# Patient Record
Sex: MALE | Race: WHITE | NOT HISPANIC OR LATINO | Employment: OTHER | URBAN - METROPOLITAN AREA
[De-identification: names, ages, dates, MRNs, and addresses within clinical notes are randomized per-mention and may not be internally consistent; named-entity substitution may affect disease eponyms.]

---

## 2023-03-16 ENCOUNTER — HOSPITAL ENCOUNTER (OUTPATIENT)
Facility: HOSPITAL | Age: 63
Discharge: HOME | End: 2023-03-18
Attending: EMERGENCY MEDICINE | Admitting: INTERNAL MEDICINE
Payer: MEDICARE

## 2023-03-16 DIAGNOSIS — G40.919 BREAKTHROUGH SEIZURE: Primary | ICD-10-CM

## 2023-03-16 DIAGNOSIS — R78.89 SUBTHERAPEUTIC SERUM DILANTIN LEVEL: ICD-10-CM

## 2023-03-16 DIAGNOSIS — R07.9 CHEST PAIN: ICD-10-CM

## 2023-03-16 LAB
ALBUMIN SERPL-MCNC: 3.4 G/DL (ref 3.4–4.8)
ALBUMIN/GLOB SERPL: 0.9 RATIO (ref 1.1–2)
ALP SERPL-CCNC: 101 UNIT/L (ref 40–150)
ALT SERPL-CCNC: 51 UNIT/L (ref 0–55)
AST SERPL-CCNC: 25 UNIT/L (ref 5–34)
BASOPHILS # BLD AUTO: 0.01 X10(3)/MCL (ref 0–0.2)
BASOPHILS NFR BLD AUTO: 0.2 %
BILIRUBIN DIRECT+TOT PNL SERPL-MCNC: 0.1 MG/DL
BUN SERPL-MCNC: 10.6 MG/DL (ref 8.4–25.7)
CALCIUM SERPL-MCNC: 9.1 MG/DL (ref 8.8–10)
CHLORIDE SERPL-SCNC: 105 MMOL/L (ref 98–107)
CO2 SERPL-SCNC: 28 MMOL/L (ref 23–31)
CREAT SERPL-MCNC: 0.75 MG/DL (ref 0.73–1.18)
EOSINOPHIL # BLD AUTO: 0.08 X10(3)/MCL (ref 0–0.9)
EOSINOPHIL NFR BLD AUTO: 1.6 %
ERYTHROCYTE [DISTWIDTH] IN BLOOD BY AUTOMATED COUNT: 12.5 % (ref 11.5–17)
GFR SERPLBLD CREATININE-BSD FMLA CKD-EPI: >60 MLS/MIN/1.73/M2
GLOBULIN SER-MCNC: 3.6 GM/DL (ref 2.4–3.5)
GLUCOSE SERPL-MCNC: 125 MG/DL (ref 82–115)
HCT VFR BLD AUTO: 35.7 % (ref 42–52)
HGB BLD-MCNC: 11.8 G/DL (ref 14–18)
IMM GRANULOCYTES # BLD AUTO: 0.04 X10(3)/MCL (ref 0–0.04)
IMM GRANULOCYTES NFR BLD AUTO: 0.8 %
LYMPHOCYTES # BLD AUTO: 1.16 X10(3)/MCL (ref 0.6–4.6)
LYMPHOCYTES NFR BLD AUTO: 22.5 %
MCH RBC QN AUTO: 31.2 PG
MCHC RBC AUTO-ENTMCNC: 33.1 G/DL (ref 33–36)
MCV RBC AUTO: 94.4 FL (ref 80–94)
MONOCYTES # BLD AUTO: 0.37 X10(3)/MCL (ref 0.1–1.3)
MONOCYTES NFR BLD AUTO: 7.2 %
NEUTROPHILS # BLD AUTO: 3.5 X10(3)/MCL (ref 2.1–9.2)
NEUTROPHILS NFR BLD AUTO: 67.7 %
NRBC BLD AUTO-RTO: 0 %
PHENOBARB SERPL-MCNC: 20.6 UG/ML (ref 15–40)
PHENYTOIN SERPL-MCNC: 8.2 UG/ML (ref 10–20)
PLATELET # BLD AUTO: 248 X10(3)/MCL (ref 130–400)
PMV BLD AUTO: 9 FL (ref 7.4–10.4)
POTASSIUM SERPL-SCNC: 3.6 MMOL/L (ref 3.5–5.1)
PROT SERPL-MCNC: 7 GM/DL (ref 5.8–7.6)
RBC # BLD AUTO: 3.78 X10(6)/MCL (ref 4.7–6.1)
SODIUM SERPL-SCNC: 143 MMOL/L (ref 136–145)
WBC # SPEC AUTO: 5.2 X10(3)/MCL (ref 4.5–11.5)

## 2023-03-16 PROCEDURE — 85025 COMPLETE CBC W/AUTO DIFF WBC: CPT | Performed by: EMERGENCY MEDICINE

## 2023-03-16 PROCEDURE — 25000242 PHARM REV CODE 250 ALT 637 W/ HCPCS: Performed by: EMERGENCY MEDICINE

## 2023-03-16 PROCEDURE — 96374 THER/PROPH/DIAG INJ IV PUSH: CPT

## 2023-03-16 PROCEDURE — G0378 HOSPITAL OBSERVATION PER HR: HCPCS

## 2023-03-16 PROCEDURE — 99285 EMERGENCY DEPT VISIT HI MDM: CPT | Mod: 25

## 2023-03-16 PROCEDURE — 25000003 PHARM REV CODE 250: Performed by: EMERGENCY MEDICINE

## 2023-03-16 PROCEDURE — 80184 ASSAY OF PHENOBARBITAL: CPT | Performed by: EMERGENCY MEDICINE

## 2023-03-16 PROCEDURE — 63600175 PHARM REV CODE 636 W HCPCS: Performed by: EMERGENCY MEDICINE

## 2023-03-16 PROCEDURE — 80053 COMPREHEN METABOLIC PANEL: CPT | Performed by: EMERGENCY MEDICINE

## 2023-03-16 PROCEDURE — 80185 ASSAY OF PHENYTOIN TOTAL: CPT | Performed by: EMERGENCY MEDICINE

## 2023-03-16 RX ORDER — SIMETHICONE 80 MG
1 TABLET,CHEWABLE ORAL 4 TIMES DAILY PRN
Status: DISCONTINUED | OUTPATIENT
Start: 2023-03-16 | End: 2023-03-18 | Stop reason: HOSPADM

## 2023-03-16 RX ORDER — ASPIRIN 325 MG
50000 TABLET, DELAYED RELEASE (ENTERIC COATED) ORAL
COMMUNITY
Start: 2023-01-23

## 2023-03-16 RX ORDER — PHENYTOIN SODIUM 100 MG/1
100 CAPSULE, EXTENDED RELEASE ORAL DAILY
Status: ON HOLD | COMMUNITY
Start: 2023-02-23 | End: 2024-02-20

## 2023-03-16 RX ORDER — RISPERIDONE 0.5 MG/1
0.5 TABLET ORAL 2 TIMES DAILY
COMMUNITY
Start: 2023-01-23 | End: 2024-02-11 | Stop reason: CLARIF

## 2023-03-16 RX ORDER — PHENYTOIN SODIUM 100 MG/1
600 CAPSULE, EXTENDED RELEASE ORAL
Status: COMPLETED | OUTPATIENT
Start: 2023-03-16 | End: 2023-03-16

## 2023-03-16 RX ORDER — POLYETHYLENE GLYCOL 3350 17 G/17G
17 POWDER, FOR SOLUTION ORAL 2 TIMES DAILY PRN
Status: DISCONTINUED | OUTPATIENT
Start: 2023-03-16 | End: 2023-03-18 | Stop reason: HOSPADM

## 2023-03-16 RX ORDER — BENZONATATE 100 MG/1
100 CAPSULE ORAL
COMMUNITY
Start: 2023-03-08 | End: 2024-02-11 | Stop reason: CLARIF

## 2023-03-16 RX ORDER — PHENOBARBITAL 32.4 MG/1
129.6 TABLET ORAL NIGHTLY
Status: DISCONTINUED | OUTPATIENT
Start: 2023-03-16 | End: 2023-03-18 | Stop reason: HOSPADM

## 2023-03-16 RX ORDER — ONDANSETRON 2 MG/ML
4 INJECTION INTRAMUSCULAR; INTRAVENOUS EVERY 4 HOURS PRN
Status: DISCONTINUED | OUTPATIENT
Start: 2023-03-16 | End: 2023-03-18 | Stop reason: HOSPADM

## 2023-03-16 RX ORDER — LEVOTHYROXINE SODIUM 50 UG/1
50 TABLET ORAL EVERY MORNING
COMMUNITY
Start: 2022-12-21

## 2023-03-16 RX ORDER — NALOXONE HCL 0.4 MG/ML
0.02 VIAL (ML) INJECTION
Status: DISCONTINUED | OUTPATIENT
Start: 2023-03-16 | End: 2023-03-18 | Stop reason: HOSPADM

## 2023-03-16 RX ORDER — PRAVASTATIN SODIUM 40 MG/1
40 TABLET ORAL NIGHTLY
COMMUNITY
Start: 2023-01-23

## 2023-03-16 RX ORDER — PHENYTOIN SODIUM 100 MG/1
100 CAPSULE, EXTENDED RELEASE ORAL ONCE
Status: DISCONTINUED | OUTPATIENT
Start: 2023-03-17 | End: 2023-03-17

## 2023-03-16 RX ORDER — PHENOBARBITAL 32.4 MG/1
129.6 TABLET ORAL NIGHTLY
COMMUNITY
Start: 2023-02-22 | End: 2024-02-11 | Stop reason: CLARIF

## 2023-03-16 RX ORDER — MONTELUKAST SODIUM 10 MG/1
10 TABLET ORAL
COMMUNITY
Start: 2022-10-21 | End: 2024-02-11 | Stop reason: CLARIF

## 2023-03-16 RX ORDER — TALC
6 POWDER (GRAM) TOPICAL NIGHTLY PRN
Status: DISCONTINUED | OUTPATIENT
Start: 2023-03-16 | End: 2023-03-18 | Stop reason: HOSPADM

## 2023-03-16 RX ORDER — PROCHLORPERAZINE EDISYLATE 5 MG/ML
5 INJECTION INTRAMUSCULAR; INTRAVENOUS EVERY 6 HOURS PRN
Status: DISCONTINUED | OUTPATIENT
Start: 2023-03-16 | End: 2023-03-18 | Stop reason: HOSPADM

## 2023-03-16 RX ORDER — MAG HYDROX/ALUMINUM HYD/SIMETH 200-200-20
30 SUSPENSION, ORAL (FINAL DOSE FORM) ORAL 4 TIMES DAILY PRN
Status: DISCONTINUED | OUTPATIENT
Start: 2023-03-16 | End: 2023-03-18 | Stop reason: HOSPADM

## 2023-03-16 RX ORDER — LORAZEPAM 2 MG/ML
INJECTION INTRAMUSCULAR
Status: DISCONTINUED
Start: 2023-03-16 | End: 2023-03-16 | Stop reason: WASHOUT

## 2023-03-16 RX ORDER — ACETAMINOPHEN 325 MG/1
650 TABLET ORAL EVERY 6 HOURS PRN
Status: DISCONTINUED | OUTPATIENT
Start: 2023-03-16 | End: 2023-03-18 | Stop reason: HOSPADM

## 2023-03-16 RX ADMIN — PHENYTOIN SODIUM 600 MG: 100 CAPSULE ORAL at 06:03

## 2023-03-16 RX ADMIN — PHENOBARBITAL 129.6 MG: 32.4 TABLET ORAL at 09:03

## 2023-03-16 RX ADMIN — DEXTROSE MONOHYDRATE 600 MG PE: 50 INJECTION, SOLUTION INTRAVENOUS at 06:03

## 2023-03-16 NOTE — ED PROVIDER NOTES
Encounter Date: 3/16/2023    SCRIBE #1 NOTE: I, Alaina Collier, am scribing for, and in the presence of,  Sheila Craig DO. I have scribed the following portions of the note - Other sections scribed: HPI, ROS, PE.     History     Chief Complaint   Patient presents with    Seizures     Pt reports per aasi with reports of witnessed seizure. Did not fall. -hit head. On dilantin and phenobarbital. GCS 11, which is baseline. Nonverbal.      62 year old male with a history of seizures and a baseline GCS of 11 presents to ED, via EMS, after having a seizure.  His family member, at bedside, says that he was sitting in his wheel chair and went rigid and unresponsive for 7-10 minutes.  He takes phenobarbital and dilantin for his seizures.  He finished a Z pack for bronchitis on Sunday.  Family member says that the last time he had to take antibiotics, his dilantin levels dropped and he had a seizure.  Pt is back to his baseline.      Review of patient's allergies indicates:  No Known Allergies  Past Medical History:   Diagnosis Date    Mental disability     Seizures      History reviewed. No pertinent surgical history.  History reviewed. No pertinent family history.  Social History     Tobacco Use    Smoking status: Never    Smokeless tobacco: Never   Substance Use Topics    Alcohol use: Never     Review of Systems   Neurological:  Positive for seizures.     Physical Exam     Initial Vitals [03/16/23 1536]   BP Pulse Resp Temp SpO2   (!) 143/81 98 18 97.2 °F (36.2 °C) 99 %      MAP       --         Physical Exam    Nursing note and vitals reviewed.  Constitutional: He appears well-developed and well-nourished. He is not diaphoretic. He does not appear ill. No distress.   HENT:   Head: Normocephalic and atraumatic.   Right Ear: External ear normal.   Left Ear: External ear normal.   Nose: Nose normal.   Mouth/Throat: Oropharynx is clear and moist.   Eyes: Conjunctivae are normal.   Neck: Neck supple. No tracheal  deviation present.   Cardiovascular:  Normal rate, regular rhythm and normal heart sounds.           Pulmonary/Chest: Breath sounds normal. No respiratory distress. He has no wheezes. He has no rhonchi. He has no rales.   Abdominal: Abdomen is soft. He exhibits no distension. There is no abdominal tenderness.   No right CVA tenderness.  No left CVA tenderness.   Musculoskeletal:         General: Normal range of motion.      Cervical back: Neck supple.      Comments: Upper extremities are contracted     Neurological: He is alert and oriented to person, place, and time. He has normal strength. No cranial nerve deficit.   Back to his neurologic baseline, answers yes or no questions   Skin: Skin is warm and dry. Capillary refill takes less than 2 seconds. No pallor.   Psychiatric: He has a normal mood and affect. His mood appears not anxious.       ED Course   Procedures  Labs Reviewed   COMPREHENSIVE METABOLIC PANEL - Abnormal; Notable for the following components:       Result Value    Glucose Level 125 (*)     Globulin 3.6 (*)     Albumin/Globulin Ratio 0.9 (*)     All other components within normal limits   PHENYTOIN LEVEL, TOTAL - Abnormal; Notable for the following components:    Phenytoin Level Total 8.2 (*)     All other components within normal limits   CBC WITH DIFFERENTIAL - Abnormal; Notable for the following components:    RBC 3.78 (*)     Hgb 11.8 (*)     Hct 35.7 (*)     MCV 94.4 (*)     All other components within normal limits   PHENOBARBITAL LEVEL - Normal   CBC W/ AUTO DIFFERENTIAL    Narrative:     The following orders were created for panel order CBC auto differential.  Procedure                               Abnormality         Status                     ---------                               -----------         ------                     CBC with Differential[[id number]]        Abnormal            Final result                 Please view results for these tests on the individual orders.           Imaging Results    None          Medications   PHENobarbitaL tablet 129.6 mg (has no administration in time range)   phenytoin (DILANTIN) ER capsule 100 mg (has no administration in time range)   phenytoin (DILANTIN) ER capsule 600 mg (600 mg Oral Given 3/16/23 1810)   FOSphenytoin (CEREBYX) 600 mg PE in dextrose 5 % (D5W) 100 mL IVPB (0 mg PE Intravenous Stopped 3/16/23 1905)              Scribe Attestation:   Scribe #1: I performed the above scribed service and the documentation accurately describes the services I performed. I attest to the accuracy of the note.    Attending Attestation:           Physician Attestation for Scribe:  Physician Attestation Statement for Scribe #1: I, Sheila Craig, DO, reviewed documentation, as scribed by Alaina Collier in my presence, and it is both accurate and complete.           ED Course as of 03/16/23 2025   Thu Mar 16, 2023   1717 Phenobarbital: 20.6  Therapeutic  [KM]   1717 Phenytoin Lvl(!): 8.2  Not therapeutic, will replace  [KM]   1822 At time of discharge, patient had another brief seizure which stopped on its own. Will give Fosphenytoin IV and observe  [KM]   2008 Per family, patient is still not fully back to baseline. Will observe overnight  [KM]      ED Course User Index  [KM] Sheila Craig MD               Medical Decision Making  61 yo male presenting after seizure, compliant with medications but just finished abx course. Back to baseline. Did not hit head.     Dilantin level is low, labs otherwise normal  Loaded with Dilantin 600 mg PO   Patient had another seizure at time of discharge, loaded with Fosphenytoin IV   Per family, patient did not appear to return to baseline   Consult: hospitalist, will observe overnight     Problems Addressed:  Breakthrough seizure: acute illness or injury that poses a threat to life or bodily functions  Subtherapeutic serum dilantin level: acute illness or injury that poses a threat to life or bodily functions    Amount  and/or Complexity of Data Reviewed  Independent Historian: caregiver  External Data Reviewed: notes.     Details: reviewed last neurology clinic visit in 11/2021- continue Dilantin and Phenobarbital at same doses  Labs: ordered. Decision-making details documented in ED Course.    Risk  Prescription drug management.       Clinical Impression:   Final diagnoses:  [G40.919] Breakthrough seizure (Primary)  [R78.89] Subtherapeutic serum dilantin level        ED Disposition Condition    Observation Stable                  Sheila Craig MD  03/16/23 1804       Sheila Craig MD  03/16/23 1831       Sheila Craig MD  03/16/23 2026

## 2023-03-17 PROBLEM — G40.919 BREAKTHROUGH SEIZURE: Status: ACTIVE | Noted: 2023-03-17

## 2023-03-17 LAB
ALBUMIN SERPL-MCNC: 3.1 G/DL (ref 3.4–4.8)
ALBUMIN/GLOB SERPL: 1.1 RATIO (ref 1.1–2)
ALP SERPL-CCNC: 91 UNIT/L (ref 40–150)
ALT SERPL-CCNC: 44 UNIT/L (ref 0–55)
AST SERPL-CCNC: 23 UNIT/L (ref 5–34)
BASOPHILS # BLD AUTO: 0.03 X10(3)/MCL (ref 0–0.2)
BASOPHILS NFR BLD AUTO: 0.5 %
BILIRUBIN DIRECT+TOT PNL SERPL-MCNC: 0.2 MG/DL
BUN SERPL-MCNC: 7.9 MG/DL (ref 8.4–25.7)
CALCIUM SERPL-MCNC: 9 MG/DL (ref 8.8–10)
CHLORIDE SERPL-SCNC: 106 MMOL/L (ref 98–107)
CO2 SERPL-SCNC: 29 MMOL/L (ref 23–31)
CREAT SERPL-MCNC: 0.69 MG/DL (ref 0.73–1.18)
EOSINOPHIL # BLD AUTO: 0.15 X10(3)/MCL (ref 0–0.9)
EOSINOPHIL NFR BLD AUTO: 2.3 %
ERYTHROCYTE [DISTWIDTH] IN BLOOD BY AUTOMATED COUNT: 12.7 % (ref 11.5–17)
GFR SERPLBLD CREATININE-BSD FMLA CKD-EPI: >60 MLS/MIN/1.73/M2
GLOBULIN SER-MCNC: 2.7 GM/DL (ref 2.4–3.5)
GLUCOSE SERPL-MCNC: 93 MG/DL (ref 82–115)
HCT VFR BLD AUTO: 31.6 % (ref 42–52)
HGB BLD-MCNC: 10.4 G/DL (ref 14–18)
IMM GRANULOCYTES # BLD AUTO: 0.04 X10(3)/MCL (ref 0–0.04)
IMM GRANULOCYTES NFR BLD AUTO: 0.6 %
LYMPHOCYTES # BLD AUTO: 2.16 X10(3)/MCL (ref 0.6–4.6)
LYMPHOCYTES NFR BLD AUTO: 32.6 %
MAGNESIUM SERPL-MCNC: 2 MG/DL (ref 1.6–2.6)
MCH RBC QN AUTO: 31.2 PG
MCHC RBC AUTO-ENTMCNC: 32.9 G/DL (ref 33–36)
MCV RBC AUTO: 94.9 FL (ref 80–94)
MONOCYTES # BLD AUTO: 0.71 X10(3)/MCL (ref 0.1–1.3)
MONOCYTES NFR BLD AUTO: 10.7 %
NEUTROPHILS # BLD AUTO: 3.54 X10(3)/MCL (ref 2.1–9.2)
NEUTROPHILS NFR BLD AUTO: 53.3 %
NRBC BLD AUTO-RTO: 0 %
PHENYTOIN SERPL-MCNC: 15.4 UG/ML (ref 10–20)
PHOSPHATE SERPL-MCNC: 3.4 MG/DL (ref 2.3–4.7)
PLATELET # BLD AUTO: 250 X10(3)/MCL (ref 130–400)
PMV BLD AUTO: 8.9 FL (ref 7.4–10.4)
POTASSIUM SERPL-SCNC: 3.9 MMOL/L (ref 3.5–5.1)
PROT SERPL-MCNC: 5.8 GM/DL (ref 5.8–7.6)
RBC # BLD AUTO: 3.33 X10(6)/MCL (ref 4.7–6.1)
SODIUM SERPL-SCNC: 143 MMOL/L (ref 136–145)
WBC # SPEC AUTO: 6.6 X10(3)/MCL (ref 4.5–11.5)

## 2023-03-17 PROCEDURE — 83735 ASSAY OF MAGNESIUM: CPT | Performed by: NURSE PRACTITIONER

## 2023-03-17 PROCEDURE — 84100 ASSAY OF PHOSPHORUS: CPT | Performed by: NURSE PRACTITIONER

## 2023-03-17 PROCEDURE — 25000003 PHARM REV CODE 250: Performed by: NURSE PRACTITIONER

## 2023-03-17 PROCEDURE — 25000003 PHARM REV CODE 250: Performed by: INTERNAL MEDICINE

## 2023-03-17 PROCEDURE — 25000003 PHARM REV CODE 250: Performed by: EMERGENCY MEDICINE

## 2023-03-17 PROCEDURE — 99222 PR INITIAL HOSPITAL CARE,LEVL II: ICD-10-PCS | Mod: ,,, | Performed by: SPECIALIST

## 2023-03-17 PROCEDURE — G0378 HOSPITAL OBSERVATION PER HR: HCPCS

## 2023-03-17 PROCEDURE — 80185 ASSAY OF PHENYTOIN TOTAL: CPT | Performed by: EMERGENCY MEDICINE

## 2023-03-17 PROCEDURE — 85025 COMPLETE CBC W/AUTO DIFF WBC: CPT | Performed by: NURSE PRACTITIONER

## 2023-03-17 PROCEDURE — 80053 COMPREHEN METABOLIC PANEL: CPT | Performed by: NURSE PRACTITIONER

## 2023-03-17 PROCEDURE — 99222 1ST HOSP IP/OBS MODERATE 55: CPT | Mod: ,,, | Performed by: SPECIALIST

## 2023-03-17 RX ORDER — RISPERIDONE 0.25 MG/1
0.5 TABLET ORAL 2 TIMES DAILY
Status: DISCONTINUED | OUTPATIENT
Start: 2023-03-17 | End: 2023-03-18 | Stop reason: HOSPADM

## 2023-03-17 RX ORDER — LEVOTHYROXINE SODIUM 50 UG/1
50 TABLET ORAL EVERY MORNING
Status: DISCONTINUED | OUTPATIENT
Start: 2023-03-17 | End: 2023-03-18 | Stop reason: HOSPADM

## 2023-03-17 RX ORDER — PHENYTOIN SODIUM 100 MG/1
200 CAPSULE, EXTENDED RELEASE ORAL NIGHTLY
Status: ON HOLD | COMMUNITY
End: 2024-02-20

## 2023-03-17 RX ORDER — PHENYTOIN SODIUM 100 MG/1
100 CAPSULE, EXTENDED RELEASE ORAL DAILY
Status: DISCONTINUED | OUTPATIENT
Start: 2023-03-17 | End: 2023-03-18 | Stop reason: HOSPADM

## 2023-03-17 RX ORDER — PRAVASTATIN SODIUM 40 MG/1
40 TABLET ORAL NIGHTLY
Status: DISCONTINUED | OUTPATIENT
Start: 2023-03-17 | End: 2023-03-18 | Stop reason: HOSPADM

## 2023-03-17 RX ORDER — PHENYTOIN SODIUM 100 MG/1
200 CAPSULE, EXTENDED RELEASE ORAL NIGHTLY
Status: DISCONTINUED | OUTPATIENT
Start: 2023-03-17 | End: 2023-03-18 | Stop reason: HOSPADM

## 2023-03-17 RX ADMIN — PHENOBARBITAL 129.6 MG: 32.4 TABLET ORAL at 08:03

## 2023-03-17 RX ADMIN — PHENYTOIN SODIUM 200 MG: 100 CAPSULE ORAL at 08:03

## 2023-03-17 RX ADMIN — RISPERIDONE 0.5 MG: 0.25 TABLET, FILM COATED ORAL at 08:03

## 2023-03-17 RX ADMIN — PHENYTOIN SODIUM 100 MG: 100 CAPSULE ORAL at 08:03

## 2023-03-17 RX ADMIN — PRAVASTATIN SODIUM 40 MG: 40 TABLET ORAL at 08:03

## 2023-03-17 RX ADMIN — LEVOTHYROXINE SODIUM 50 MCG: 50 TABLET ORAL at 06:03

## 2023-03-17 NOTE — PROGRESS NOTES
[facility]  Hospital Medicine Progress Note        CHIEF COMPLAINT   Seizures (Pt reports per aasi with reports of witnessed seizure. Did not fall. -hit head. On dilantin and phenobarbital. GCS 11, which is baseline. Nonverbal. )        HISTORY OF PRESENT ILLNESS:   Much of the information for this HPI was gathered from the medical record and his sister at the bedside, as he is not able to help much due to his mental disability.  Mr. Anderson is a 62 year old male with a pmh of mental disability and seizures on dilantin and phenobarbital who presented to the ED after having a seizure earlier today.  Family states that the seizure lasted approximately 7-10 minutes.  Family states that he just finished a Z-diamond 3 days ago, and the last time he was on antibiotics, it dropped his dilantin level and he had a seizure.  His dilantin level today was 8.2, phenobarbital level was 20.6, normal. All other indices unremarkable.  He was given a 600 mg oral dose of Dilantin in the ED in preparation of dc home, but then he had another seizure.  He was then given Cerebyx 600 mg IV in the ED and admitted to hospital medicine for management.    62-year-old male with epilepsy who presented with breakthrough seizure prior to arrival and had another witnessed seizure in the ER. Patient is on Dilantin and phenobarbital.  Dilantin level subtherapeutic on arrival.      Todays information  - vitals reviewed and stable   Labs reviewed and stable   Get a CT of the head without contrast stat  Consult Neuro to adjust antiepileptic medications  Family members report abdominal distension   CT abdomen was ordered in the ED, follow-up with results       Exam  GENERAL: awake, alert, oriented and in no acute distress, non-toxic appearing   HEENT: normocephalic atraumatic   NECK: supple   LUNGS: Clear bilaterally, no wheezing or rales, no accessory muscle use   CVS: Regular rate and rhythm, normal peripheral perfusion  ABD:  Soft, non-tender, non-distended, bowel sounds present  EXTREMITIES: no clubbing or cyanosis  SKIN: Warm, dry.   NEURO: alert and oriented, grossly without focal deficits   PSYCHIATRIC: Cooperative    ASSESSMENT & PLAN:     1.  Breakthrough seizure  2.  Subtherapeutic dilantin level contributing to above  3. Abdominal distension    PLAN:  - vitals reviewed and stable   Labs reviewed and stable   Get a CT of the head without contrast stat  Consult Neuro to adjust antiepileptic medications  Continue current AEDS    Family members report abdominal distension   CT abdomen was ordered in the ED, follow-up with results   Seizure precautions  Resume home meds      DVT prophylaxis: SCDs  Code status: Full     Dispo pending neuro eval      VITAL SIGNS: 24 HRS MIN & MAX LAST   Temp  Min: 97.2 °F (36.2 °C)  Max: 98.2 °F (36.8 °C) 98.2 °F (36.8 °C)   BP  Min: 105/58  Max: 174/83 136/79   Pulse  Min: 74  Max: 105  93   Resp  Min: 12  Max: 21 13   SpO2  Min: 93 %  Max: 100 % 100 %       Recent Labs   Lab 03/16/23  1609 03/17/23  0418   WBC 5.2 6.6   RBC 3.78* 3.33*   HGB 11.8* 10.4*   HCT 35.7* 31.6*   MCV 94.4* 94.9*   MCH 31.2 31.2   MCHC 33.1 32.9*   RDW 12.5 12.7    250   MPV 9.0 8.9       Recent Labs   Lab 03/16/23  1609 03/17/23  0418    143   K 3.6 3.9   CO2 28 29   BUN 10.6 7.9*   CREATININE 0.75 0.69*   CALCIUM 9.1 9.0   MG  --  2.00   ALBUMIN 3.4 3.1*   ALKPHOS 101 91   ALT 51 44   AST 25 23   BILITOT 0.1 0.2          Microbiology Results (last 7 days)       ** No results found for the last 168 hours. **             See below for Radiology    Scheduled Med:   levothyroxine  50 mcg Oral QAM    PHENobarbitaL  129.6 mg Oral Nightly    phenytoin  100 mg Oral Daily    phenytoin  200 mg Oral QHS    pravastatin  40 mg Oral QHS    risperiDONE  0.5 mg Oral BID        Continuous Infusions:       PRN Meds:  acetaminophen, aluminum-magnesium hydroxide-simethicone, melatonin, naloxone, ondansetron, polyethylene glycol,  prochlorperazine, simethicone         VTE prophylaxis:     Patient condition:  Stable/Fair/Guarded/ Serious/ Critical    Anticipated discharge and Disposition:         All diagnosis and differential diagnosis have been reviewed; assessment and plan has been documented; I have personally reviewed the labs and test results that are presently available; I have reviewed the patients medication list; I have reviewed the consulting providers response and recommendations. I have reviewed or attempted to review medical records based upon their availability    All of the patient's questions have been  addressed and answered. Patient's is agreeable to the above stated plan. I will continue to monitor closely and make adjustments to medical management as needed.  _____________________________________________________________________    Nutrition Status:    Radiology:  X-Ray Abdomen Flat And Erect  Narrative: EXAMINATION:  XR ABDOMEN FLAT AND ERECT    CLINICAL HISTORY:  abd distension;    TECHNIQUE:  Flat and erect AP views of the abdomen.    COMPARISON:  CT 03/17/2023    FINDINGS:  Multiple gas-filled segments of small bowel and colon.  No dilated bowel, significant air-fluid levels or definitive findings for pneumoperitoneum.  Small volume stool.  Impression: Nonobstructive bowel gas pattern.    Electronically signed by: Saeed Hernandez  Date:    03/17/2023  Time:    10:56  CT Head Without Contrast  EXAMINATION  CT HEAD WITHOUT CONTRAST    CLINICAL HISTORY  breakthrough seizures with fall;    TECHNIQUE  Axial non-contrast CT images of the head were acquired and multiplanar reconstructions accomplished by a CT technologist at a separate workstation, pushed to PACS for physician review.    COMPARISON  None available at the time of the initial interpretation.    FINDINGS  Images were reviewed in subdural, brain, soft tissue, and bone windows.    Exam quality: Motion/streak artifact limits assessment of the posterior  fossa.    Hemorrhage: No evidence of acute hyperattenuating blood products.    Parenchyma: No discrete mass, localized mass-effect, or CT evidence of an acute territorial cortical-based ischemic insult. Gray-white differentiation is preserved.    Midline shift: None.    CSF spaces: Normal ventricle size and configuration. No masses or expansile fluid collections.    Vasculature: No hyperdense artery identified. No abnormal densities within the dural sinuses.    Other findings: No abnormalities of the scalp or subjacent osseous structures. Mastoids are well aerated. No focal abnormality of the sella. The included facial structures are unremarkable.    IMPRESSION  No CT-evident acute intracranial abnormality.    RADIATION DOSE  Automated tube current modulation, weight-based exposure dosing, and/or iterative reconstruction technique utilized to reach lowest reasonably achievable exposure rate.    DLP: 981 mGy*cm    Electronically signed by: Sherman Carnes  Date:    03/17/2023  Time:    09:33  CT Abdomen Pelvis  Without Contrast  Narrative: EXAMINATION:  CT ABDOMEN PELVIS WITHOUT CONTRAST    CLINICAL HISTORY:  Abdominal pain, acute, nonlocalized;Abdominal distention, family request;    TECHNIQUE:  Helical acquisition through the abdomen and pelvis without IV contrast.  Lack of contrast limits evaluation of solid organs and vascular structures .  Three plane reconstructions were provided for review.  mGycm. Automatic exposure control, adjustment of mA/kV or iterative reconstruction technique was used to reduce radiation.    COMPARISON:  No prior CT    FINDINGS:  Lung bases are grossly clear.  There is gynecomastia.    Scattered calcifications are seen along the liver capsule likely the sequela of remote insult.  The gallbladder is surgically absent.  There is no significant abnormality of the spleen.  There is some fatty atrophy of the pancreas.  Normal adrenals.  No hydronephrosis.    There is no bowel  obstruction.  Normal appendix.  Moderate stool in the colon.    Mild bladder wall thickening may relate to chronic outlet obstruction.  Prostate not significantly enlarged.  No pelvic free fluid.  The abdominal aorta is normal in caliber.  Minimal atherosclerotic disease.  No enlarged retroperitoneal lymph nodes.    Degenerative changes of the spine with some focal kyphosis thoracolumbar junction.  No acute osseous findings.  Impression: No acute abdominopelvic findings on this noncontrast scan.  Chronic findings above.    Electronically signed by: Alec Mckeon  Date:    03/17/2023  Time:    09:28      Jemal Montague MD   03/17/2023

## 2023-03-17 NOTE — PLAN OF CARE
Referral sent to [facility] via [employer].     03/17/23 1528   Discharge Assessment   Assessment Type Discharge Planning Assessment   Confirmed/corrected address, phone number and insurance Yes   Confirmed Demographics Correct on Facesheet   Source of Information family   When was your last doctors appointment? 03/01/23  (PCP: Dr. Claude Meeks)   Communicated IRAM with patient/caregiver Date not available/Unable to determine   Reason For Admission seizure   People in Home parent(s);sibling(s)   Do you expect to return to your current living situation? Yes   Do you have help at home or someone to help you manage your care at home? Yes   Who are your caregiver(s) and their phone number(s)? sister is primary caregiver Cadence Riley (cell [phone], house [phone])   Prior to hospitilization cognitive status: Unable to Assess   Current cognitive status: Unable to Assess   Walking or Climbing Stairs ambulation difficulty, dependent   Mobility Management wheelchair   Dressing/Bathing bathing difficulty, requires equipment   Home Accessibility wheelchair accessible   Home Layout Able to live on 1st floor   Equipment Currently Used at Home bath bench;grab bar;wheelchair   Readmission within 30 days? No   Patient currently being followed by outpatient case management? No   Do you currently have service(s) that help you manage your care at home? No  (had [facility] recently dc'd, would like to be re-established)   Do you take prescription medications? Yes  (fills rx at [facility] in [place])   Do you have prescription coverage? Yes   Coverage Medicare/Medicaid   Do you have any problems affording any of your prescribed medications? No   Is the patient taking medications as prescribed? yes   Who is going to help you get home at discharge? sister or niece will bring home   How do you get to doctors appointments? family or friend will provide   Are you on dialysis? No   Do you take coumadin? No   Discharge  Plan A Home Health   Discharge Plan B Home Health   DME Needed Upon Discharge  none   Discharge Plan discussed with: Sibling   Discharge Barriers Identified None     Initial dc planning assessment was performed with patients sister/primary caregiver, Melinda.  He was recently dc'd from  services with [facility], would like to be re-established. A referral was sent.

## 2023-03-17 NOTE — H&P
[facility] Hospital Medicine History & Physical Examination       Patient Name: Sincere Anderson  MRN: [id number]  Patient Class: OP- Observation   Admission Date: 3/16/2023  3:40 PM  Length of Stay: 0  Admitting Service: Hospital Medicine   Attending Physician: Milton Patel MD   Primary Care Provider: Claude H Meeks, MD  History source: EMR, patient and/or patient's family    CHIEF COMPLAINT   Seizures (Pt reports per aasi with reports of witnessed seizure. Did not fall. -hit head. On dilantin and phenobarbital. GCS 11, which is baseline. Nonverbal. )      HISTORY OF PRESENT ILLNESS:   Much of the information for this HPI was gathered from the medical record and his sister at the bedside, as he is not able to help much due to his mental disability.  Mr. Anderson is a 62 year old male with a pmh of mental disability and seizures on dilantin and phenobarbital who presented to the ED after having a seizure earlier today.  Family states that the seizure lasted approximately 7-10 minutes.  Family states that he just finished a Z-diamond 3 days ago, and the last time he was on antibiotics, it dropped his dilantin level and he had a seizure.  His dilantin level today was 8.2, phenobarbital level was 20.6, normal. All other indices unremarkable.  He was given a 600 mg oral dose of Dilantin in the ED in preparation of dc home, but then he had another seizure.  He was then given Cerebyx 600 mg IV in the ED and admitted to hospital medicine for management.    PAST MEDICAL HISTORY:     Past Medical History:   Diagnosis Date    Mental disability     Seizures        PAST SURGICAL HISTORY:   Cholecystectomy    ALLERGIES:   Patient has no known allergies.    FAMILY HISTORY:   Reviewed and non-contributory     SOCIAL HISTORY:     Social History     Tobacco Use    Smoking status: Never    Smokeless tobacco: Never   Substance Use Topics    Alcohol use: Never        HOME MEDICATIONS:     Prior to  Admission medications    Medication Sig Start Date End Date Taking? Authorizing Provider   benzonatate (TESSALON) 100 MG capsule Take 100 mg by mouth. 3/8/23   Historical Provider   cholecalciferol, vitamin D3, 1,250 mcg (50,000 unit) capsule Take 50,000 Units by mouth every 7 days. 1/23/23   Historical Provider   DILANTIN EXTENDED 100 mg ER capsule Take by mouth. 2/23/23   Historical Provider   levothyroxine (SYNTHROID) 50 MCG tablet Take 50 mcg by mouth every morning. 12/21/22   Historical Provider   montelukast (SINGULAIR) 10 mg tablet Take 10 mg by mouth. 10/21/22   Historical Provider   PHENobarbitaL 32.4 MG tablet Take 129.6 mg by mouth every evening. 2/22/23   Historical Provider   pravastatin (PRAVACHOL) 40 MG tablet Take 40 mg by mouth every evening. 1/23/23   Historical Provider   risperiDONE (RISPERDAL) 0.5 MG Tab Take 0.5 mg by mouth 2 (two) times daily. 1/23/23   Historical Provider       REVIEW OF SYSTEMS:   Except as documented, all other systems reviewed and negative     PHYSICAL EXAM:   T 97.2 °F (36.2 °C)   BP (!) 158/86   P 98   RR 18   O2 97 %  GENERAL: awake, alert, oriented and in no acute distress, non-toxic appearing   HEENT: normocephalic atraumatic   NECK: supple   LUNGS: Clear bilaterally, no wheezing or rales, no accessory muscle use   CVS: Regular rate and rhythm, normal peripheral perfusion  ABD: Soft, non-tender, non-distended, bowel sounds present  EXTREMITIES: no clubbing or cyanosis  SKIN: Warm, dry.   NEURO: alert and oriented, grossly without focal deficits   PSYCHIATRIC: Cooperative    LABS AND IMAGING:     Recent Labs     03/16/23  1609   WBC 5.2   RBC 3.78*   HGB 11.8*   HCT 35.7*   MCV 94.4*   MCH 31.2   MCHC 33.1   RDW 12.5        No results for input(s): LACTIC in the last 72 hours.  No results for input(s): INR, APTT, D-DIMER in the last 72 hours.  No results for input(s): HGBA1C, CHOL, TRIG, LDL, VLDL, HDL in the last 72 hours.   Recent Labs     03/16/23  1609   NA  143   K 3.6   CHLORIDE 105   CO2 28   BUN 10.6   CREATININE 0.75   GLUCOSE 125*   CALCIUM 9.1   ALBUMIN 3.4   GLOBULIN 3.6*   ALKPHOS 101   ALT 51   AST 25   BILITOT 0.1     No results for input(s): BNP, CPK, TROPONINI in the last 72 hours.       No image results found.      ASSESSMENT & PLAN:     1.  Breakthrough seizure  2.  Subtherapeutic dilantin level contributing to above      PLAN:  -Dilantin loading dose given in ED  -Seizure precautions  -Resume home meds  -Labs in AM    I, Adrianna Orlando NP have reviewed and discussed this case with Dr. Patel.  Please see addendum for further assessment and plan from attending MD.    DVT prophylaxis: SCDs  Code status: Full      ___________________________________________________________________  I, Dr. Milton Paetl assumed care of this patient.  For the patient encounter, I performed the substantive portion of the visit, I reviewed the NPPA documentation, treatment plan, and medical decision making.  I had face to face time with this patient.  I have personally reviewed the labs and test results that are presently available. I have reviewed or attempted to review medical records based upon their availability. If patient was admitted under observational status it is with my approval.      62-year-old male with epilepsy who presented with breakthrough seizure prior to arrival and had another witnessed seizure in the ER.  Recent new medications including steroids which were stopped yesterday and azithromycin completed recently as well.  Patient is on Dilantin and phenobarbital.  Dilantin level subtherapeutic on arrival.  Patient given Dilantin IV in the ER and is being admitted for monitoring overnight.     Time seen: 11PM 3/16/23  Milton Patel MD

## 2023-03-17 NOTE — PLAN OF CARE
BRAN Letter explained to patient's sister via phone, questions answered. Letter sent to DANNIE Ambriz for signature/delivery.

## 2023-03-17 NOTE — CONSULTS
Chr epilepsy patient w special needs    Breakthr seizure typical when he's sick or gets antibx per sister caregiver     Baseline nonverbal nonambulatory     Exam now:  Friendly   Reached his hand out to shake mine   Smiling   [employer]'s     CT head cerebellar atrophy     Pht 100/200  Phb 34.6 (4) nightly among others     Levels pht 8 then 15; phb 21    Dx:   Chr epil with breakthr seiz    Rec: resume anticonvulsants at home dose     Signing off   Hopefully home tomorrow   Fu primary MD or previous neurologist

## 2023-03-18 VITALS
HEIGHT: 72 IN | SYSTOLIC BLOOD PRESSURE: 114 MMHG | WEIGHT: 186.94 LBS | DIASTOLIC BLOOD PRESSURE: 70 MMHG | TEMPERATURE: 98 F | HEART RATE: 75 BPM | BODY MASS INDEX: 25.32 KG/M2 | OXYGEN SATURATION: 99 % | RESPIRATION RATE: 20 BRPM

## 2023-03-18 PROBLEM — G40.919 BREAKTHROUGH SEIZURE: Status: RESOLVED | Noted: 2023-03-17 | Resolved: 2023-03-18

## 2023-03-18 PROCEDURE — G0378 HOSPITAL OBSERVATION PER HR: HCPCS

## 2023-03-18 PROCEDURE — 25000003 PHARM REV CODE 250: Performed by: NURSE PRACTITIONER

## 2023-03-18 PROCEDURE — 25000003 PHARM REV CODE 250: Performed by: INTERNAL MEDICINE

## 2023-03-18 RX ADMIN — LEVOTHYROXINE SODIUM 50 MCG: 50 TABLET ORAL at 06:03

## 2023-03-18 RX ADMIN — PHENYTOIN SODIUM 100 MG: 100 CAPSULE ORAL at 09:03

## 2023-03-18 RX ADMIN — RISPERIDONE 0.5 MG: 0.25 TABLET, FILM COATED ORAL at 09:03

## 2023-03-18 NOTE — PLAN OF CARE
Problem: Adult Inpatient Plan of Care  Goal: Plan of Care Review  Outcome: Ongoing, Progressing  Flowsheets (Taken 3/18/2023 0236)  Plan of Care Reviewed With: patient  Goal: Patient-Specific Goal (Individualized)  Outcome: Ongoing, Progressing  Goal: Absence of Hospital-Acquired Illness or Injury  Outcome: Ongoing, Progressing  Goal: Optimal Comfort and Wellbeing  Outcome: Ongoing, Progressing     Problem: Skin Injury Risk Increased  Goal: Skin Health and Integrity  Outcome: Ongoing, Progressing

## 2023-03-18 NOTE — PROGRESS NOTES
[facility] Hospital Medicine Progress Note        Chief Complaint: Inpatient Follow-up for seizure    HPI:   Much of the information for this HPI was gathered from the medical record and his sister at the bedside, as he is not able to help much due to his mental disability.  Mr. Anderson is a 62 year old male with a pmh of mental disability and seizures on dilantin and phenobarbital who presented to the ED after having a seizure earlier today.  Family states that the seizure lasted approximately 7-10 minutes.  Family states that he just finished a Z-diamond 3 days ago, and the last time he was on antibiotics, it dropped his dilantin level and he had a seizure.  His dilantin level today was 8.2, phenobarbital level was 20.6, normal. All other indices unremarkable.  He was given a 600 mg oral dose of Dilantin in the ED in preparation of dc home, but then he had another seizure.  He was then given Cerebyx 600 mg IV in the ED and admitted to hospital medicine for management.    Interval Hx:   Afebrile.  Hemodynamically stable.  Doing well on room air.  No labs were available for this morning.  CT abdomen was benign    Objective/physical exam:  Vitals:    03/18/23 0012 03/18/23 0442 03/18/23 0444 03/18/23 0721   BP:  125/70  131/79   Pulse:  75  71   Resp:    20   Temp: 97.5 °F (36.4 °C)  97.5 °F (36.4 °C) 98.2 °F (36.8 °C)   TempSrc: Axillary  Axillary Oral   SpO2:  (!) 94%  96%   Weight:       Height:         General: In no acute distress, afebrile  Respiratory: Clear to auscultation bilaterally  Cardiovascular: S1, S2, no appreciable murmur  Abdomen: Soft, nontender, BS +  MSK: Warm, no lower extremity edema, no clubbing or cyanosis  Neurologic: Alert and oriented x4, moving all extremities with good strength     Lab Results   Component Value Date     03/17/2023    K 3.9 03/17/2023    CO2 29 03/17/2023    BUN 7.9 (L) 03/17/2023    CREATININE 0.69 (L) 03/17/2023    CALCIUM 9.0 03/17/2023       Lab Results   Component Value Date    ALT 44 03/17/2023    AST 23 03/17/2023    ALKPHOS 91 03/17/2023    BILITOT 0.2 03/17/2023      Lab Results   Component Value Date    WBC 6.6 03/17/2023    HGB 10.4 (L) 03/17/2023    HCT 31.6 (L) 03/17/2023    MCV 94.9 (H) 03/17/2023     03/17/2023           Medications:   levothyroxine  50 mcg Oral QAM    PHENobarbitaL  129.6 mg Oral Nightly    phenytoin  100 mg Oral Daily    phenytoin  200 mg Oral QHS    pravastatin  40 mg Oral QHS    risperiDONE  0.5 mg Oral BID      acetaminophen, aluminum-magnesium hydroxide-simethicone, melatonin, naloxone, ondansetron, polyethylene glycol, prochlorperazine, simethicone     Assessment/Plan:    Breakthrough seizure   Subtherapeutic Dilantin   Abdominal distension -improving  Chronic hypothyroidism  Hyperlipidemia    Plan:   -neurology following.  Appreciate assistance.  Continue seizure precautions, current AEDs.  -CT abdomen reviewed.  Encourage p.o. intake  -other home medications will be reviewed and renewed     SCDs      Benjamin Hoffmann MD

## 2023-03-18 NOTE — DISCHARGE SUMMARY
[facility]  Observation Unit  [facility] Medicine  Discharge Summary      Patient Name: Sincere Anderson  MRN: [id number]  [facility]: [id number]  Patient Class: OP- Observation  Admission Date: 3/16/2023  Hospital Length of Stay: 0 days  Discharge Date and Time:  03/18/2023 2:00 PM  Attending Physician: Milton Patel MD   Discharging Provider: Benjamin Hoffmann MD  Primary Care Provider: Claude H Meeks, MD    Primary Care Team: Networked reference to record PCT     Much of the information for this HPI was gathered from the medical record and his sister at the bedside, as he is not able to help much due to his mental disability.  Mr. Anderson is a 62 year old male with a pmh of mental disability and seizures on dilantin and phenobarbital who presented to the ED after having a seizure earlier today.  Family states that the seizure lasted approximately 7-10 minutes.  Family states that he just finished a Z-diamond 3 days ago, and the last time he was on antibiotics, it dropped his dilantin level and he had a seizure.  His dilantin level today was 8.2, phenobarbital level was 20.6, normal. All other indices unremarkable.  He was given a 600 mg oral dose of Dilantin in the ED in preparation of dc home, but then he had another seizure.  He was then given Cerebyx 600 mg IV in the ED and admitted to hospital medicine for management. Neuro was consulted and recommended to resume home medications, cleared for discharge.  CT abdomen obtained due to complaints of distension showed no abnormalities. He remained seizure free and will be discharged to home with  today. All medications were reconciled.    Breakthrough seizure  Subtherapeutic dilantin level contributing to above   Chronic hypothyroidism  Hyperlipidemia            Goals of Care Treatment Preferences:  Code Status: Full Code      Consults:   Consults (From admission, onward)        Status Ordering Provider     IP consult to case management  Once         Provider:  (Not yet assigned)    Acknowledged EUN MUNOZ     Inpatient consult to Neurology  Once        Provider:  Denton Bellamy MD    Completed ANDRESSA SWANN          No new Assessment & Plan notes have been filed under this hospital service since the last note was generated.  Service: Hospital Medicine    Final Active Diagnoses:      Problems Resolved During this Admission:    Diagnosis Date Noted Date Resolved POA    PRINCIPAL PROBLEM:  Breakthrough seizure [G40.919] 03/17/2023 03/18/2023 Yes       Discharged Condition: good    Disposition: Home health    Follow Up:   Follow-up Information     Schedule an appointment as soon as possible for a visit  with Claude H Meeks, MD.    Specialty: Family Medicine  Contact information:  [address] [place] [address] [address] Erick [address]ARD [id number]  [phone]             [facility] - Emergency Dept.    Specialty: Emergency Medicine  Why: As needed, If symptoms worsen  Contact information:  [address] [place] [id number] [phone]                     Patient Instructions:      SUBSEQUENT HOME HEALTH ORDERS   Order Comments: Arrange Home Health services; eval and treat for all disciplines including PT/OT   PCP:   Dr. Claude Meeks     Order Specific Question Answer Comments   What Home Health Agency is the patient currently using? Other/External [facility]       Significant Diagnostic Studies: Labs:   CMP   Recent Labs   Lab 03/16/23  1609 03/17/23  0418    143   K 3.6 3.9   CO2 28 29   BUN 10.6 7.9*   CREATININE 0.75 0.69*   CALCIUM 9.1 9.0   ALBUMIN 3.4 3.1*   BILITOT 0.1 0.2   ALKPHOS 101 91   AST 25 23   ALT 51 44       Pending Diagnostic Studies:     Procedure Component Value Units Date/Time    GI Panel [[id number]]     Order Status: Sent Lab Status: No result     Specimen: Stool          Medications:  Reconciled Home Medications:      Medication List      CONTINUE taking these medications    benzonatate 100 MG  capsule  Commonly known as: TESSALON  Take 100 mg by mouth.     cholecalciferol (vitamin D3) 1,250 mcg (50,000 unit) capsule  Take 50,000 Units by mouth every 7 days.     * DILANTIN EXTENDED 100 MG ER capsule  Generic drug: phenytoin  Take 100 mg by mouth once daily.     * phenytoin 100 MG ER capsule  Commonly known as: DILANTIN  Take 200 mg by mouth every evening.     levothyroxine 50 MCG tablet  Commonly known as: SYNTHROID  Take 50 mcg by mouth every morning.     montelukast 10 mg tablet  Commonly known as: SINGULAIR  Take 10 mg by mouth.     PHENobarbitaL 32.4 MG tablet  Take 129.6 mg by mouth every evening.     pravastatin 40 MG tablet  Commonly known as: PRAVACHOL  Take 40 mg by mouth every evening.     risperiDONE 0.5 MG Tab  Commonly known as: RISPERDAL  Take 0.5 mg by mouth 2 (two) times daily.         * This list has 2 medication(s) that are the same as other medications prescribed for you. Read the directions carefully, and ask your doctor or other care provider to review them with you.                Indwelling Lines/Drains at time of discharge:   Lines/Drains/Airways     None                 Time spent on the discharge of patient: 35 minutes         Benjamin Hoffmann MD  Department of Hospital Medicine  [facility] - Observation Unit

## 2024-02-11 ENCOUNTER — HOSPITAL ENCOUNTER (INPATIENT)
Facility: HOSPITAL | Age: 64
LOS: 9 days | DRG: 884 | End: 2024-02-20
Attending: GENERAL PRACTICE | Admitting: INTERNAL MEDICINE
Payer: MEDICARE

## 2024-02-11 DIAGNOSIS — R62.7 FAILURE TO THRIVE IN ADULT: ICD-10-CM

## 2024-02-11 DIAGNOSIS — R20.2 PARESTHESIA OF BILATERAL LEGS: ICD-10-CM
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
## 2024-02-11 DIAGNOSIS — R54 AGE-RELATED PHYSICAL DEBILITY: Primary | ICD-10-CM
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
## 2024-02-11 PROBLEM — G40.909 SEIZURE DISORDER: Status: ACTIVE | Noted: 2023-03-17

## 2024-02-11 PROBLEM — F81.9 MENTAL DEVELOPMENTAL DELAY: Status: ACTIVE | Noted: 2024-02-11

## 2024-02-11 PROBLEM — R53.81 PHYSICAL DECONDITIONING: Status: ACTIVE | Noted: 2024-02-11

## 2024-02-11 LAB
ALBUMIN SERPL-MCNC: 3.8 G/DL (ref 3.4–4.8)
ALBUMIN/GLOB SERPL: 1.2 RATIO (ref 1.1–2)
ALP SERPL-CCNC: 115 UNIT/L (ref 40–150)
ALT SERPL-CCNC: 12 UNIT/L (ref 0–55)
AST SERPL-CCNC: 15 UNIT/L (ref 5–34)
BASOPHILS # BLD AUTO: 0.02 X10(3)/MCL
BASOPHILS NFR BLD AUTO: 0.3 %
BILIRUB SERPL-MCNC: 0.2 MG/DL
BUN SERPL-MCNC: 34 MG/DL (ref 8.4–25.7)
CALCIUM SERPL-MCNC: 9.2 MG/DL (ref 8.8–10)
CHLORIDE SERPL-SCNC: 108 MMOL/L (ref 98–107)
CO2 SERPL-SCNC: 26 MMOL/L (ref 23–31)
CREAT SERPL-MCNC: 0.76 MG/DL (ref 0.73–1.18)
EOSINOPHIL # BLD AUTO: 0.11 X10(3)/MCL (ref 0–0.9)
EOSINOPHIL NFR BLD AUTO: 1.9 %
ERYTHROCYTE [DISTWIDTH] IN BLOOD BY AUTOMATED COUNT: 13.2 % (ref 11.5–17)
GFR SERPLBLD CREATININE-BSD FMLA CKD-EPI: >60 MLS/MIN/1.73/M2
GLOBULIN SER-MCNC: 3.1 GM/DL (ref 2.4–3.5)
GLUCOSE SERPL-MCNC: 84 MG/DL (ref 82–115)
HCT VFR BLD AUTO: 37.2 % (ref 42–52)
HGB BLD-MCNC: 12.1 G/DL (ref 14–18)
IMM GRANULOCYTES # BLD AUTO: 0.02 X10(3)/MCL (ref 0–0.04)
IMM GRANULOCYTES NFR BLD AUTO: 0.3 %
LYMPHOCYTES # BLD AUTO: 1.44 X10(3)/MCL (ref 0.6–4.6)
LYMPHOCYTES NFR BLD AUTO: 24.5 %
MCH RBC QN AUTO: 31.6 PG (ref 27–31)
MCHC RBC AUTO-ENTMCNC: 32.5 G/DL (ref 33–36)
MCV RBC AUTO: 97.1 FL (ref 80–94)
MONOCYTES # BLD AUTO: 0.56 X10(3)/MCL (ref 0.1–1.3)
MONOCYTES NFR BLD AUTO: 9.5 %
NEUTROPHILS # BLD AUTO: 3.72 X10(3)/MCL (ref 2.1–9.2)
NEUTROPHILS NFR BLD AUTO: 63.5 %
NRBC BLD AUTO-RTO: 0 %
PHENOBARB SERPL-MCNC: 22.1 UG/ML (ref 15–40)
PHENYTOIN SERPL-MCNC: 16.8 UG/ML (ref 10–20)
PLATELET # BLD AUTO: 207 X10(3)/MCL (ref 130–400)
PMV BLD AUTO: 8.6 FL (ref 7.4–10.4)
POTASSIUM SERPL-SCNC: 4.1 MMOL/L (ref 3.5–5.1)
PROT SERPL-MCNC: 6.9 GM/DL (ref 5.8–7.6)
RBC # BLD AUTO: 3.83 X10(6)/MCL (ref 4.7–6.1)
SODIUM SERPL-SCNC: 142 MMOL/L (ref 136–145)
WBC # SPEC AUTO: 5.87 X10(3)/MCL (ref 4.5–11.5)

## 2024-02-11 PROCEDURE — 80185 ASSAY OF PHENYTOIN TOTAL: CPT | Performed by: GENERAL PRACTICE

## 2024-02-11 PROCEDURE — 80184 ASSAY OF PHENOBARBITAL: CPT | Performed by: GENERAL PRACTICE

## 2024-02-11 PROCEDURE — 11000001 HC ACUTE MED/SURG PRIVATE ROOM

## 2024-02-11 PROCEDURE — 85025 COMPLETE CBC W/AUTO DIFF WBC: CPT | Performed by: GENERAL PRACTICE

## 2024-02-11 PROCEDURE — 25000003 PHARM REV CODE 250: Performed by: GENERAL PRACTICE

## 2024-02-11 PROCEDURE — 99285 EMERGENCY DEPT VISIT HI MDM: CPT | Mod: 25

## 2024-02-11 PROCEDURE — 63600175 PHARM REV CODE 636 W HCPCS: Performed by: GENERAL PRACTICE

## 2024-02-11 PROCEDURE — 80053 COMPREHEN METABOLIC PANEL: CPT | Performed by: GENERAL PRACTICE

## 2024-02-11 PROCEDURE — 25000003 PHARM REV CODE 250: Performed by: INTERNAL MEDICINE

## 2024-02-11 RX ORDER — RISPERIDONE 1 MG/1
1 TABLET ORAL 2 TIMES DAILY
Status: DISCONTINUED | OUTPATIENT
Start: 2024-02-11 | End: 2024-02-20 | Stop reason: HOSPADM

## 2024-02-11 RX ORDER — FAMOTIDINE 20 MG/1
20 TABLET, FILM COATED ORAL 2 TIMES DAILY
Status: DISCONTINUED | OUTPATIENT
Start: 2024-02-11 | End: 2024-02-13

## 2024-02-11 RX ORDER — PHENYTOIN SODIUM 100 MG/1
100 CAPSULE, EXTENDED RELEASE ORAL DAILY
Status: DISCONTINUED | OUTPATIENT
Start: 2024-02-11 | End: 2024-02-13

## 2024-02-11 RX ORDER — LEVOTHYROXINE SODIUM 50 UG/1
50 TABLET ORAL EVERY MORNING
Status: DISCONTINUED | OUTPATIENT
Start: 2024-02-12 | End: 2024-02-20 | Stop reason: HOSPADM

## 2024-02-11 RX ORDER — DEXTROSE MONOHYDRATE AND SODIUM CHLORIDE 5; .9 G/100ML; G/100ML
INJECTION, SOLUTION INTRAVENOUS CONTINUOUS
Status: DISCONTINUED | OUTPATIENT
Start: 2024-02-11 | End: 2024-02-11

## 2024-02-11 RX ORDER — SODIUM CHLORIDE 0.9 % (FLUSH) 0.9 %
10 SYRINGE (ML) INJECTION
Status: DISCONTINUED | OUTPATIENT
Start: 2024-02-11 | End: 2024-02-20 | Stop reason: HOSPADM

## 2024-02-11 RX ORDER — PHENOBARBITAL 32.4 MG/1
120 TABLET ORAL 2 TIMES DAILY
Status: DISCONTINUED | OUTPATIENT
Start: 2024-02-11 | End: 2024-02-12

## 2024-02-11 RX ORDER — KETOROLAC TROMETHAMINE 10 MG/1
10 TABLET, FILM COATED ORAL
Status: COMPLETED | OUTPATIENT
Start: 2024-02-11 | End: 2024-02-11

## 2024-02-11 RX ORDER — QUETIAPINE FUMARATE 25 MG/1
50 TABLET, FILM COATED ORAL 2 TIMES DAILY PRN
Status: DISCONTINUED | OUTPATIENT
Start: 2024-02-11 | End: 2024-02-20 | Stop reason: HOSPADM

## 2024-02-11 RX ORDER — FEXOFENADINE HCL 60 MG
180 TABLET ORAL DAILY
COMMUNITY

## 2024-02-11 RX ORDER — TALC
6 POWDER (GRAM) TOPICAL NIGHTLY PRN
Status: DISCONTINUED | OUTPATIENT
Start: 2024-02-11 | End: 2024-02-20 | Stop reason: HOSPADM

## 2024-02-11 RX ORDER — ONDANSETRON HYDROCHLORIDE 2 MG/ML
4 INJECTION, SOLUTION INTRAVENOUS EVERY 8 HOURS PRN
Status: DISCONTINUED | OUTPATIENT
Start: 2024-02-11 | End: 2024-02-20 | Stop reason: HOSPADM

## 2024-02-11 RX ORDER — ACETAMINOPHEN 325 MG/1
650 TABLET ORAL EVERY 6 HOURS PRN
Status: DISCONTINUED | OUTPATIENT
Start: 2024-02-11 | End: 2024-02-20 | Stop reason: HOSPADM

## 2024-02-11 RX ORDER — AMOXICILLIN 250 MG
1 CAPSULE ORAL 2 TIMES DAILY
Status: DISCONTINUED | OUTPATIENT
Start: 2024-02-11 | End: 2024-02-20 | Stop reason: HOSPADM

## 2024-02-11 RX ORDER — QUETIAPINE FUMARATE 50 MG/1
50 TABLET, FILM COATED ORAL 2 TIMES DAILY PRN
COMMUNITY
Start: 2024-01-24

## 2024-02-11 RX ORDER — PHENOBARBITAL 30 MG/1
120 TABLET ORAL NIGHTLY
COMMUNITY
End: 2024-02-21

## 2024-02-11 RX ORDER — RISPERIDONE 1 MG/1
1 TABLET, ORALLY DISINTEGRATING ORAL 2 TIMES DAILY
COMMUNITY

## 2024-02-11 RX ADMIN — ACETAMINOPHEN 650 MG: 325 TABLET, FILM COATED ORAL at 09:02

## 2024-02-11 RX ADMIN — DEXTROSE AND SODIUM CHLORIDE: 5; 900 INJECTION, SOLUTION INTRAVENOUS at 02:02

## 2024-02-11 RX ADMIN — Medication 6 MG: at 09:02

## 2024-02-11 RX ADMIN — SENNOSIDES AND DOCUSATE SODIUM 1 TABLET: 50; 8.6 TABLET ORAL at 08:02

## 2024-02-11 RX ADMIN — RISPERIDONE 1 MG: 1 TABLET ORAL at 08:02

## 2024-02-11 RX ADMIN — KETOROLAC TROMETHAMINE 10 MG: 10 TABLET, FILM COATED ORAL at 10:02

## 2024-02-11 RX ADMIN — FAMOTIDINE 20 MG: 20 TABLET ORAL at 08:02

## 2024-02-11 RX ADMIN — PHENOBARBITAL 129.6 MG: 32.4 TABLET ORAL at 08:02

## 2024-02-11 NOTE — ED NOTES
Difficulty bearing weight on his legs, pain to lower back and both legs, more to left knee. Family states he fell at his home last week.

## 2024-02-11 NOTE — SUBJECTIVE & OBJECTIVE
Past Medical History:   Diagnosis Date    Mental disability     Seizures     Thyroid disease        Past Surgical History:   Procedure Laterality Date    CHOLECYSTECTOMY         Review of patient's allergies indicates:  No Known Allergies    No current facility-administered medications on file prior to encounter.     Current Outpatient Medications on File Prior to Encounter   Medication Sig    cholecalciferol, vitamin D3, 1,250 mcg (50,000 unit) capsule Take 50,000 Units by mouth every 7 days.    DILANTIN EXTENDED 100 mg ER capsule Take 100 mg by mouth once daily.    fexofenadine (ALLEGRA) 60 MG tablet Take 180 mg by mouth once daily.    levothyroxine (SYNTHROID) 50 MCG tablet Take 50 mcg by mouth every morning.    PHENobarbitaL (LUMINAL) 30 MG tablet Take 120 mg by mouth 2 (two) times daily.    pravastatin (PRAVACHOL) 40 MG tablet Take 40 mg by mouth every evening.    risperiDONE (RISPERDAL M-TABS) 1 MG TbDL Take 1 mg by mouth 2 (two) times daily.    phenytoin (DILANTIN) 100 MG ER capsule Take 200 mg by mouth every evening.    QUEtiapine (SEROQUEL) 50 MG tablet Take 50 mg by mouth 2 (two) times daily as needed.    [DISCONTINUED] benzonatate (TESSALON) 100 MG capsule Take 100 mg by mouth.    [DISCONTINUED] montelukast (SINGULAIR) 10 mg tablet Take 10 mg by mouth.    [DISCONTINUED] PHENobarbitaL 32.4 MG tablet Take 129.6 mg by mouth every evening.    [DISCONTINUED] risperiDONE (RISPERDAL) 0.5 MG Tab Take 0.5 mg by mouth 2 (two) times daily.     Family History       Problem Relation (Age of Onset)    Diabetes Mother    Hypertension Father          Tobacco Use    Smoking status: Never    Smokeless tobacco: Never   Substance and Sexual Activity    Alcohol use: Never    Drug use: Never    Sexual activity: Not on file     Review of Systems   Unable to perform ROS: Acuity of condition   Objective:     Vital Signs (Most Recent):  Temp: 97.6 °F (36.4 °C) (02/11/24 1252)  Pulse: 77 (02/11/24 1252)  Resp: 18 (02/11/24  1252)  BP: (!) 146/78 (02/11/24 1252)  SpO2: 99 % (02/11/24 0937) Vital Signs (24h Range):  Temp:  [97.5 °F (36.4 °C)-97.6 °F (36.4 °C)] 97.6 °F (36.4 °C)  Pulse:  [77-88] 77  Resp:  [18-20] 18  SpO2:  [99 %] 99 %  BP: (146-161)/(78-92) 146/78     Weight: 89.9 kg (198 lb 3.1 oz)  Body mass index is 26.88 kg/m².     Physical Exam  Constitutional:       Appearance: Normal appearance. He is normal weight.   HENT:      Head: Normocephalic and atraumatic.      Nose: Nose normal.      Mouth/Throat:      Mouth: Mucous membranes are moist.      Pharynx: Oropharynx is clear.   Eyes:      Extraocular Movements: Extraocular movements intact and EOM normal.      Conjunctiva/sclera: Conjunctivae normal.      Pupils: Pupils are equal, round, and reactive to light.   Cardiovascular:      Rate and Rhythm: Normal rate and regular rhythm.      Pulses: Normal pulses.      Heart sounds: Normal heart sounds.   Pulmonary:      Effort: Pulmonary effort is normal.      Breath sounds: Normal breath sounds.   Abdominal:      General: Bowel sounds are normal.      Palpations: Abdomen is soft.   Musculoskeletal:         General: Normal range of motion.      Cervical back: Normal range of motion and neck supple.      Right lower leg: Edema present.      Left lower leg: Edema present.   Skin:     General: Skin is warm and dry.      Capillary Refill: Capillary refill takes 2 to 3 seconds.   Neurological:      General: No focal deficit present.      Mental Status: He is alert. Mental status is at baseline.   Psychiatric:         Attention and Perception: He is inattentive.         Mood and Affect: Mood is elated.         Speech: Speech is rapid and pressured.         Behavior: Behavior is hyperactive.         Cognition and Memory: Cognition is impaired. Memory is impaired.         Judgment: Judgment is inappropriate.          CRANIAL NERVES     CN I  cranial nerve I not tested    CN II   Visual fields full to confrontation.     CN III, IV, VI  "  Pupils are equal, round, and reactive to light.  Extraocular motions are normal.   CN III: no CN III palsy  CN VI: no CN VI palsy    CN V   Facial sensation intact.     CN VII   Facial expression full, symmetric.     CN VIII   CN VIII normal.     CN IX, X   CN IX normal.   CN X normal.     CN XI   CN XI normal.     CN XII   CN XII normal.      Significant Labs: All pertinent labs within the past 24 hours have been reviewed.  BMP:   Recent Labs   Lab 02/11/24  1205      K 4.1   CO2 26   BUN 34.0*   CREATININE 0.76   CALCIUM 9.2     CBC:   Recent Labs   Lab 02/11/24  1205   WBC 5.87   HGB 12.1*   HCT 37.2*        CMP:   Recent Labs   Lab 02/11/24  1205      K 4.1   CO2 26   BUN 34.0*   CREATININE 0.76   CALCIUM 9.2   ALBUMIN 3.8   BILITOT 0.2   ALKPHOS 115   AST 15   ALT 12     Magnesium: No results for input(s): "MG" in the last 48 hours.    Significant Imaging: I have reviewed all pertinent imaging results/findings within the past 24 hours.  "

## 2024-02-11 NOTE — PLAN OF CARE
Problem: Adult Inpatient Plan of Care  Goal: Plan of Care Review  Outcome: Ongoing, Progressing  Goal: Patient-Specific Goal (Individualized)  Outcome: Ongoing, Progressing  Goal: Absence of Hospital-Acquired Illness or Injury  Outcome: Ongoing, Progressing  Goal: Optimal Comfort and Wellbeing  Outcome: Ongoing, Progressing  Goal: Readiness for Transition of Care  Outcome: Ongoing, Progressing     Problem: Seizure, Active Management  Goal: Absence of Seizure/Seizure-Related Injury  Outcome: Ongoing, Progressing     Problem: Pain Acute  Goal: Acceptable Pain Control and Functional Ability  Outcome: Ongoing, Progressing     Problem: Fall Injury Risk  Goal: Absence of Fall and Fall-Related Injury  Outcome: Ongoing, Progressing     Problem: Skin Injury Risk Increased  Goal: Skin Health and Integrity  Outcome: Ongoing, Progressing

## 2024-02-11 NOTE — ED NOTES
Pain during turning position for xray. Tech in room with portable and family assisting communications with the patient.

## 2024-02-11 NOTE — ED PROVIDER NOTES
Encounter Date: 2/11/2024       History     Chief Complaint   Patient presents with    Fall     Unwitnessed fall last Saturday.  Knees are very weak and reports back pain since fall.       Unwitnessed fall last Saturday.  Knees are very weak and reports back pain since fall. Patient is normally in a wheelchair but since fall resulting in left hip bruising, the patient has reported weak knees and difficulty standing for long periods of time.    The history is provided by the patient.   Fall  The accident occurred several days ago. The fall occurred while walking. He landed on A hard floor. Point of impact: Unknown. The pain is present in the back and left hip. The pain is at a severity of 4/10. He was Ambulatory at the scene. Associated symptoms include back pain. The symptoms are aggravated by activity, use of the injured limb and standing. He has tried nothing for the symptoms.     Review of patient's allergies indicates:  No Known Allergies  Past Medical History:   Diagnosis Date    Mental disability     Seizures      No past surgical history on file.  No family history on file.  Social History     Tobacco Use    Smoking status: Never    Smokeless tobacco: Never   Substance Use Topics    Alcohol use: Never    Drug use: Never     Review of Systems   Constitutional: Negative.    HENT: Negative.     Eyes: Negative.    Respiratory: Negative.     Cardiovascular: Negative.    Gastrointestinal: Negative.    Endocrine: Negative.    Genitourinary: Negative.    Musculoskeletal:  Positive for back pain and myalgias.   Skin: Negative.    Allergic/Immunologic: Negative.    Neurological: Negative.    Hematological: Negative.    Psychiatric/Behavioral: Negative.     All other systems reviewed and are negative.      Physical Exam     Initial Vitals [02/11/24 0937]   BP Pulse Resp Temp SpO2   (!) 161/92 88 20 97.5 °F (36.4 °C) 99 %      MAP       --         Physical Exam    Nursing note and vitals reviewed.  Constitutional: He  appears well-developed and well-nourished.   HENT:   Head: Normocephalic and atraumatic.   Mouth/Throat: Oropharynx is clear and moist.   Eyes: EOM are normal. Pupils are equal, round, and reactive to light.   Neck: Neck supple.   Normal range of motion.  Cardiovascular:  Normal rate, regular rhythm, normal heart sounds and intact distal pulses.           Pulmonary/Chest: Breath sounds normal.   Abdominal: Abdomen is soft. Bowel sounds are normal.   Musculoskeletal:      Cervical back: Normal range of motion and neck supple.     Neurological: He is alert and oriented to person, place, and time. He has normal strength. GCS score is 15. GCS eye subscore is 4. GCS verbal subscore is 5. GCS motor subscore is 6.   Moves all extremities   Skin: Skin is warm and dry.   Psychiatric: He has a normal mood and affect. His behavior is normal. Judgment and thought content normal.         ED Course   Procedures  Labs Reviewed   COMPREHENSIVE METABOLIC PANEL - Abnormal; Notable for the following components:       Result Value    Chloride 108 (*)     Blood Urea Nitrogen 34.0 (*)     All other components within normal limits   CBC WITH DIFFERENTIAL - Abnormal; Notable for the following components:    RBC 3.83 (*)     Hgb 12.1 (*)     Hct 37.2 (*)     MCV 97.1 (*)     MCH 31.6 (*)     MCHC 32.5 (*)     All other components within normal limits   CBC W/ AUTO DIFFERENTIAL    Narrative:     The following orders were created for panel order CBC auto differential.  Procedure                               Abnormality         Status                     ---------                               -----------         ------                     CBC with Differential[[id number]]        Abnormal            Final result                 Please view results for these tests on the individual orders.   PHENYTOIN LEVEL, TOTAL   PHENOBARBITAL LEVEL          Imaging Results              CT Lumbar Spine Without Contrast (Final result)  Result time 02/11/24  11:01:40      Final result by Abbe Harris MD (02/11/24 11:01:40)                   Impression:      1. No acute fracture or malalignment identified.    2. Lumbar degenerative disc disease and spondylosis level by level discussed above.      Electronically signed by: Abbe Harris  Date:    02/11/2024  Time:    11:01               Narrative:    EXAMINATION:  CT LUMBAR SPINE WITHOUT CONTRAST    CLINICAL HISTORY:  Low back pain, progressive neurologic deficit;Low back pain, increased fracture risk;Compression fracture, lumbar;    TECHNIQUE:  Multidetector axial images were performed of the lumbar spine without contrast and the images were reformatted.    Dose length product of 1769 mGycm. Automated exposure control was utilized to minimize radiation dose.    COMPARISON:  CT abdomen pelvis March 17, 2022    FINDINGS:  There is mild chronic compression deformity of T12 vertebral body without significant interval change.  There is also slight chronic depression along the superior endplate of L2 without interval difference.  Otherwise, lumbar vertebrae stature is preserved and the alignment is unremarkable.  No acute fracture or dislocation.  Demineralization of the bones.  Disc segmental analysis is given below:    At L1-L2, disc height is preserved.  There is right facet arthropathy without significant central canal stenosis.  There are no narrowings of the neural foramen.    At L2-L3, disc height is preserved.  Bilateral mild facet arthropathy.  Central canal is not stenosed.  There are no narrowings of the neural foramen.    At L3-L4, there is bulging of annulus fibrosis which slightly indents the ventral thecal sac.  Bilateral ligamentum flavum thickening and some facet arthropathy.  Central canal stenosis is minimal.  There are no narrowings of the neural foramen.    At L4-L5, there is generalized disc bulge which mildly indents the ventral thecal sac.  Ligament flavum thickening and facet arthropathy.  Central  canal stenosis is mild.  There is moderate narrowing right neural foramen caused by lateralized portion of the disc.  The left neural foramen is unremarkable.    At L5-S1, there is disc bulge slightly indents the ventral thecal sac.  Central canal is not stenosed.  There are no narrowings of the neural foramen                                       X-Ray Hip 2 or 3 views Right (with Pelvis when performed) (Final result)  Result time 02/11/24 10:53:39      Final result by Alec Mckeon MD (02/11/24 10:53:39)                   Impression:      No acute findings pelvis/hips.      Electronically signed by: Alec Mckeon  Date:    02/11/2024  Time:    10:53               Narrative:    EXAMINATION:  XR HIP WITH PELVIS WHEN PERFORMED, 2 OR 3  VIEWS RIGHT; XR HIP WITH PELVIS WHEN PERFORMED, 2 OR 3 VIEWS LEFT    CLINICAL HISTORY:  accidental fall;fall;; fall;  Unspecified fall, initial encounter    COMPARISON:  None    FINDINGS:  Frontal image of the pelvis with two views bilateral hips.  There is no fracture or dislocation.  Mild degenerative changes of the hips without significant joint space narrowing.                                       X-Ray Hip 2 or 3 views Left (with Pelvis when performed) (Final result)  Result time 02/11/24 10:53:39   Procedure changed from X-Ray Hips Bilateral 2 View Incl AP Pelvis     Final result by Alec Mckeon MD (02/11/24 10:53:39)                   Impression:      No acute findings pelvis/hips.      Electronically signed by: Alec Mckeon  Date:    02/11/2024  Time:    10:53               Narrative:    EXAMINATION:  XR HIP WITH PELVIS WHEN PERFORMED, 2 OR 3  VIEWS RIGHT; XR HIP WITH PELVIS WHEN PERFORMED, 2 OR 3 VIEWS LEFT    CLINICAL HISTORY:  accidental fall;fall;; fall;  Unspecified fall, initial encounter    COMPARISON:  None    FINDINGS:  Frontal image of the pelvis with two views bilateral hips.  There is no fracture or dislocation.  Mild degenerative changes of the hips without  significant joint space narrowing.                                       X-Ray Lumbar Spine Ap And Lateral (Final result)  Result time 02/11/24 10:53:07      Final result by Abbe Harris MD (02/11/24 10:53:07)                   Impression:      No acute fracture or malalignment identified.      Electronically signed by: Abbe Harris  Date:    02/11/2024  Time:    10:53               Narrative:    EXAMINATION:  XR LUMBAR SPINE AP AND LATERAL    CLINICAL HISTORY:  Fall one week ago with Back pain and leg weakness;    TECHNIQUE:  Two views    COMPARISON:  CT abdomen pelvis March 17, 2023.    FINDINGS:  There is demineralization of bones.  There is slight depression along the superior endplate of L2 without significant interval change.  Transverse processes on the anterior projection are seen with limitations.  As visualized, no acute fracture or dislocation identified.  Mild intervertebral disc space degenerative change at L5-S1 and also facet arthropathy.                                       Medications   ketorolac tablet 10 mg (10 mg Oral Given 2/11/24 1012)     Medical Decision Making  Spoke with Dr. Vega. Normal blood work. We will put the patient as inpatient admit. The plan isto possibly skill him.    Amount and/or Complexity of Data Reviewed  Labs: ordered.  Radiology: ordered.               ED Course as of 02/11/24 1246   Sun Feb 11, 2024   1026 Patient noted to be in severe pain while doing x-rays.  He has a difficult time resting on his side and moving his left hip.  X-ray suspicious for irregularity of the lumbar spine and so we will get a CT scan of this. [PG]   1130 The knee spoke with Dr. Avila earlier and is requesting the patient be put in for either skilled or nursing home placement.  I discussed the case with Dr. Avila and he is requesting laboratory work be done.  We will get some routine labs and Dr. Avila will come to speak with the patient's niece who used to work here.  No fractures or  anything emergent is noted on either the lumbar or pelvis. [PG]      ED Course User Index  [PG] Marin Sullivan MD                           Clinical Impression:  Final diagnoses:  [W19.XXXA, Y92.009] Fall as cause of accidental injury in home as place of occurrence, initial encounter  [R54] Age-related physical debility (Primary)  [R62.7] Failure to thrive in adult  [R20.2] Paresthesia of bilateral legs          ED Disposition Condition    Admit Stable                Marin Sullivan MD  02/11/24 7555

## 2024-02-11 NOTE — H&P
[facility] - Medical Surgical Unit  [facility] Medicine  History & Physical    Patient Name: Sincere Anderson  MRN: [id number]  Patient Class: IP- Inpatient  Admission Date: 2/11/2024  Attending Physician: Milton Vega MD   Primary Care Provider: Meeks, Claude H., MD         Patient information was obtained from relative(s), past medical records, and ER records.     Subjective:     Principal Problem:Physical deconditioning    Chief Complaint:   Chief Complaint   Patient presents with    Fall     Unwitnessed fall last Saturday.  Knees are very weak and reports back pain since fall.         HPI: 62 yo male presents to the ED with weakness.  He fell last week and has been more weak and c/o back pain since.  As per the caregiver he is usually able to get up to assist with ADL's but now he can not.  His main caregiver was his mother but she passed away which then fell to his siter and she passed away last year.  The sister who is caring for him now is not able to care or him now since he requires more heavy lifting since his fall.  Imaging of back with X-ray and CT did not show any acute findings.  Labs were obtained which were stable.  No N/V/D/C.  No fever/chills.  No chest pain/SOB.  He does have some bruising from his falls. Due to inability to be cared for the family brought him in for possible placement.  Will get PT/OT involved as well to address his weakness.  It appears he can more all extremities.  He does have mental disability and cannot choice for himself.  He will be admitted for failure to thrive, therapy and possible placement to NH.    Past Medical History:   Diagnosis Date    Mental disability     Seizures     Thyroid disease        Past Surgical History:   Procedure Laterality Date    CHOLECYSTECTOMY         Review of patient's allergies indicates:  No Known Allergies    No current facility-administered medications on file prior to encounter.     Current Outpatient Medications on File  Prior to Encounter   Medication Sig    cholecalciferol, vitamin D3, 1,250 mcg (50,000 unit) capsule Take 50,000 Units by mouth every 7 days.    DILANTIN EXTENDED 100 mg ER capsule Take 100 mg by mouth once daily.    fexofenadine (ALLEGRA) 60 MG tablet Take 180 mg by mouth once daily.    levothyroxine (SYNTHROID) 50 MCG tablet Take 50 mcg by mouth every morning.    PHENobarbitaL (LUMINAL) 30 MG tablet Take 120 mg by mouth 2 (two) times daily.    pravastatin (PRAVACHOL) 40 MG tablet Take 40 mg by mouth every evening.    risperiDONE (RISPERDAL M-TABS) 1 MG TbDL Take 1 mg by mouth 2 (two) times daily.    phenytoin (DILANTIN) 100 MG ER capsule Take 200 mg by mouth every evening.    QUEtiapine (SEROQUEL) 50 MG tablet Take 50 mg by mouth 2 (two) times daily as needed.    [DISCONTINUED] benzonatate (TESSALON) 100 MG capsule Take 100 mg by mouth.    [DISCONTINUED] montelukast (SINGULAIR) 10 mg tablet Take 10 mg by mouth.    [DISCONTINUED] PHENobarbitaL 32.4 MG tablet Take 129.6 mg by mouth every evening.    [DISCONTINUED] risperiDONE (RISPERDAL) 0.5 MG Tab Take 0.5 mg by mouth 2 (two) times daily.     Family History       Problem Relation (Age of Onset)    Diabetes Mother    Hypertension Father          Tobacco Use    Smoking status: Never    Smokeless tobacco: Never   Substance and Sexual Activity    Alcohol use: Never    Drug use: Never    Sexual activity: Not on file     Review of Systems   Unable to perform ROS: Acuity of condition   Objective:     Vital Signs (Most Recent):  Temp: 97.6 °F (36.4 °C) (02/11/24 1252)  Pulse: 77 (02/11/24 1252)  Resp: 18 (02/11/24 1252)  BP: (!) 146/78 (02/11/24 1252)  SpO2: 99 % (02/11/24 0937) Vital Signs (24h Range):  Temp:  [97.5 °F (36.4 °C)-97.6 °F (36.4 °C)] 97.6 °F (36.4 °C)  Pulse:  [77-88] 77  Resp:  [18-20] 18  SpO2:  [99 %] 99 %  BP: (146-161)/(78-92) 146/78     Weight: 89.9 kg (198 lb 3.1 oz)  Body mass index is 26.88 kg/m².     Physical Exam  Constitutional:       Appearance:  Normal appearance. He is normal weight.   HENT:      Head: Normocephalic and atraumatic.      Nose: Nose normal.      Mouth/Throat:      Mouth: Mucous membranes are moist.      Pharynx: Oropharynx is clear.   Eyes:      Extraocular Movements: Extraocular movements intact and EOM normal.      Conjunctiva/sclera: Conjunctivae normal.      Pupils: Pupils are equal, round, and reactive to light.   Cardiovascular:      Rate and Rhythm: Normal rate and regular rhythm.      Pulses: Normal pulses.      Heart sounds: Normal heart sounds.   Pulmonary:      Effort: Pulmonary effort is normal.      Breath sounds: Normal breath sounds.   Abdominal:      General: Bowel sounds are normal.      Palpations: Abdomen is soft.   Musculoskeletal:         General: Normal range of motion.      Cervical back: Normal range of motion and neck supple.      Right lower leg: Edema present.      Left lower leg: Edema present.   Skin:     General: Skin is warm and dry.      Capillary Refill: Capillary refill takes 2 to 3 seconds.   Neurological:      General: No focal deficit present.      Mental Status: He is alert. Mental status is at baseline.   Psychiatric:         Attention and Perception: He is inattentive.         Mood and Affect: Mood is elated.         Speech: Speech is rapid and pressured.         Behavior: Behavior is hyperactive.         Cognition and Memory: Cognition is impaired. Memory is impaired.         Judgment: Judgment is inappropriate.          CRANIAL NERVES     CN I  cranial nerve I not tested    CN II   Visual fields full to confrontation.     CN III, IV, VI   Pupils are equal, round, and reactive to light.  Extraocular motions are normal.   CN III: no CN III palsy  CN VI: no CN VI palsy    CN V   Facial sensation intact.     CN VII   Facial expression full, symmetric.     CN VIII   CN VIII normal.     CN IX, X   CN IX normal.   CN X normal.     CN XI   CN XI normal.     CN XII   CN XII normal.      Significant Labs: All  "pertinent labs within the past 24 hours have been reviewed.  BMP:   Recent Labs   Lab 02/11/24  1205      K 4.1   CO2 26   BUN 34.0*   CREATININE 0.76   CALCIUM 9.2     CBC:   Recent Labs   Lab 02/11/24  1205   WBC 5.87   HGB 12.1*   HCT 37.2*        CMP:   Recent Labs   Lab 02/11/24  1205      K 4.1   CO2 26   BUN 34.0*   CREATININE 0.76   CALCIUM 9.2   ALBUMIN 3.8   BILITOT 0.2   ALKPHOS 115   AST 15   ALT 12     Magnesium: No results for input(s): "MG" in the last 48 hours.    Significant Imaging: I have reviewed all pertinent imaging results/findings within the past 24 hours.  Assessment/Plan:     * Physical deconditioning  Admit to inpatient  Consult PT/OT  Follow labs  Resume home meds  CM for placement      Mental developmental delay  Review and resume home meds      Failure to thrive in adult  Therapy  NH placement      Seizure disorder  Resume home meds  Closely monitor        VTE Risk Mitigation (From admission, onward)           Ordered     IP VTE LOW RISK PATIENT  Once         02/11/24 1334     Place RACHEL hose  Until discontinued         02/11/24 1334     Place sequential compression device  Until discontinued         02/11/24 1334                                    Milton Vega MD  Department of [facility] Medicine  [facility]          "

## 2024-02-11 NOTE — HPI
64 yo male presents to the ED with weakness.  He fell last week and has been more weak and c/o back pain since.  As per the caregiver he is usually able to get up to assist with ADL's but now he can not.  His main caregiver was his mother but she passed away which then fell to his siter and she passed away last year.  The sister who is caring for him now is not able to care or him now since he requires more heavy lifting since his fall.  Imaging of back with X-ray and CT did not show any acute findings.  Labs were obtained which were stable.  No N/V/D/C.  No fever/chills.  No chest pain/SOB.  He does have some bruising from his falls. Due to inability to be cared for the family brought him in for possible placement.  Will get PT/OT involved as well to address his weakness.  It appears he can more all extremities.  He does have mental disability and cannot choice for himself.  He will be admitted for failure to thrive, therapy and possible placement to NH.

## 2024-02-12 PROCEDURE — 94761 N-INVAS EAR/PLS OXIMETRY MLT: CPT

## 2024-02-12 PROCEDURE — 25000003 PHARM REV CODE 250: Performed by: INTERNAL MEDICINE

## 2024-02-12 PROCEDURE — 11000001 HC ACUTE MED/SURG PRIVATE ROOM

## 2024-02-12 PROCEDURE — 97162 PT EVAL MOD COMPLEX 30 MIN: CPT

## 2024-02-12 PROCEDURE — 97166 OT EVAL MOD COMPLEX 45 MIN: CPT

## 2024-02-12 PROCEDURE — 25000003 PHARM REV CODE 250: Performed by: GENERAL PRACTICE

## 2024-02-12 RX ORDER — PHENYTOIN SODIUM 100 MG/1
200 CAPSULE, EXTENDED RELEASE ORAL ONCE
Status: COMPLETED | OUTPATIENT
Start: 2024-02-12 | End: 2024-02-12

## 2024-02-12 RX ORDER — PHENYTOIN SODIUM 100 MG/1
200 CAPSULE, EXTENDED RELEASE ORAL NIGHTLY
Status: DISCONTINUED | OUTPATIENT
Start: 2024-02-12 | End: 2024-02-13

## 2024-02-12 RX ORDER — PHENOBARBITAL 32.4 MG/1
120 TABLET ORAL NIGHTLY
Status: DISCONTINUED | OUTPATIENT
Start: 2024-02-12 | End: 2024-02-20 | Stop reason: HOSPADM

## 2024-02-12 RX ADMIN — PHENYTOIN SODIUM 200 MG: 100 CAPSULE ORAL at 08:02

## 2024-02-12 RX ADMIN — FAMOTIDINE 20 MG: 20 TABLET ORAL at 08:02

## 2024-02-12 RX ADMIN — RISPERIDONE 1 MG: 1 TABLET ORAL at 08:02

## 2024-02-12 RX ADMIN — PHENYTOIN SODIUM 200 MG: 100 CAPSULE ORAL at 09:02

## 2024-02-12 RX ADMIN — LEVOTHYROXINE SODIUM 50 MCG: 0.05 TABLET ORAL at 05:02

## 2024-02-12 RX ADMIN — SENNOSIDES AND DOCUSATE SODIUM 1 TABLET: 50; 8.6 TABLET ORAL at 08:02

## 2024-02-12 RX ADMIN — ACETAMINOPHEN 650 MG: 325 TABLET, FILM COATED ORAL at 08:02

## 2024-02-12 RX ADMIN — PHENOBARBITAL 129.6 MG: 32.4 TABLET ORAL at 08:02

## 2024-02-12 RX ADMIN — Medication 6 MG: at 08:02

## 2024-02-12 RX ADMIN — PHENYTOIN SODIUM 100 MG: 100 CAPSULE ORAL at 08:02

## 2024-02-12 NOTE — PROGRESS NOTES
Inpatient Nutrition Assessment    Admit Date: 2/11/2024   Total duration of encounter: 1 day   Patient Age: 63 y.o.    Nutrition Recommendation/Prescription     Continue regular, bite-size diet as tolerated  Weigh weekly  Monitor need for ONS    Communication of Recommendations:  EMR    Nutrition Assessment     Malnutrition Assessment/Nutrition-Focused Physical Exam       Does not meet criteria     A minimum of two characteristics is recommended for diagnosis of either severe or non-severe malnutrition.    Chart Review    Reason Seen: continuous nutrition monitoring    Malnutrition Screening Tool Results   Have you recently lost weight without trying?: No  Have you been eating poorly because of a decreased appetite?: No   MST Score: 0   Diagnosis:  Physical Deconditioning, mental development delay, FTT in adult, seizure d/o    Relevant Medical History: Seizures, Mental disability, thyroid disease    Scheduled Medications:  famotidine, 20 mg, BID  levothyroxine, 50 mcg, QAM  PHENobarbitaL, 129.6 mg, Nightly  phenytoin, 100 mg, Daily  phenytoin, 200 mg, QHS  risperiDONE, 1 mg, BID  senna-docusate 8.6-50 mg, 1 tablet, BID    Continuous Infusions:   PRN Medications: acetaminophen, melatonin, ondansetron, QUEtiapine, sodium chloride 0.9%    Calorie Containing IV Medications: no significant kcals from medications at this time    Recent Labs   Lab 02/11/24  1205      K 4.1   CALCIUM 9.2   CHLORIDE 108*   CO2 26   BUN 34.0*   CREATININE 0.76   EGFRNORACEVR >60   GLUCOSE 84   BILITOT 0.2   ALKPHOS 115   ALT 12   AST 15   ALBUMIN 3.8   WBC 5.87   HGB 12.1*   HCT 37.2*     Nutrition Orders:  Diet Adult Regular (bite sized)      Appetite/Oral Intake: good/% of meals  Factors Affecting Nutritional Intake: none identified  Food/Pentecostal/Cultural Preferences: none reported  Food Allergies: none reported  Last Bowel Movement: 02/09/24  Wound(s):  Intact    Comments    (2/12) Pt with good appetite and intake of lunch  today (100% recorded). No difficulties chewing or swallowing. Denied N/V/C/D. UBW unknown.    Anthropometrics    Height: 6' (182.9 cm), Height Method: Stated  Last Weight: 89.9 kg (198 lb 3.1 oz) (02/11/24 0937), Weight Method: Bed Scale  BMI (Calculated): 26.9  BMI Classification: overweight (BMI 25-29.9)        Ideal Body Weight (IBW), Male: 178 lb     % Ideal Body Weight, Male (lb): 111.35 %                          Usual Weight Provided By: unable to obtain usual weight    Wt Readings from Last 5 Encounters:   02/11/24 89.9 kg (198 lb 3.1 oz)   03/17/23 84.8 kg (186 lb 15.2 oz)     Weight Change(s) Since Admission: new admit  Wt Readings from Last 1 Encounters:   02/11/24 0937 89.9 kg (198 lb 3.1 oz)   Admit Weight: 89.9 kg (198 lb 3.1 oz) (02/11/24 0937), Weight Method: Bed Scale    Estimated Needs    Weight Used For Calorie Calculations: 89.9 kg (198 lb 3.1 oz)  Energy Calorie Requirements (kcal): 1798 kcal (20 kcal/kg)  Energy Need Method: Kcal/kg  Weight Used For Protein Calculations: 89.9 kg (198 lb 3.1 oz)  Protein Requirements: 90 gm (1 gm/kg)  Fluid Requirements (mL): 1798 mL (1 mL/kcal)    Enteral Nutrition     Patient not receiving enteral nutrition at this time.    Parenteral Nutrition     Patient not receiving parenteral nutrition support at this time.    Evaluation of Received Nutrient Intake    Calories: meeting estimated needs  Protein: not meeting estimated needs    Patient Education     Not applicable.    Nutrition Diagnosis   No nutrition dx at this time    Nutrition Interventions     Intervention(s): general/healthful diet    Goal: Maintain weight throughout hospitalization. (new)      Nutrition Goals & Monitoring     Dietitian will monitor: food and beverage intake, weight, electrolyte/renal panel, glucose/endocrine profile, and gastrointestinal profile    Nutrition Risk/Follow-Up: low (follow-up in 5-7 days)   Please consult if re-assessment needed sooner.

## 2024-02-12 NOTE — PT/OT/SLP PROGRESS
Name: Sincere Anderson    : 1960 (63 y.o.)  MRN: [id number]           Patient Not Seen      Patient unable to be seen at this time secondary to: pt currently sleeping with no family at BS. Spoke with RN who reports pt went to sleep after getting meds. RN reports they will be able to assist up to chair later today if needed as well as tomorrow. PT will return on Wednesday as part of 3x/week POC to help mobilize patient and determine if continued PT services needed.

## 2024-02-12 NOTE — PT/OT/SLP EVAL
Physical Therapy Acute Care Evaluation    Patient Name:  Sincere Anderson   MRN:  [id number]    History:     Past Medical History:   Diagnosis Date    Mental disability     Seizures     Thyroid disease        Past Surgical History:   Procedure Laterality Date    CHOLECYSTECTOMY         Recent Surgery: * No surgery found *      Subjective     Chief Complaint: knee and back pain  Patient/Family Comments/goals: return mobility and potential placement to NH  Pain/Comfort:  Pain Rating 1: other (see comments) (unable to verbalize score based on cognition)  Location - Side 1: Left  Location 1: knee  Pain Addressed 1: Pre-medicate for activity, Reposition, Distraction, Cessation of Activity  Pain Rating Post-Intervention 1: 0/10  Location - Orientation 2: lower  Location 2: back  Pain Addressed 2: Pre-medicate for activity, Distraction  Pain Rating Post-Intervention 2: 0/10      Living Environment:  Pt was living with sister, who also runs small  in her home.    Prior to admission, patients level of function was dependant, wheelchair bound. Has never been ambulatory. Equipment used at home: wheelchair, grab bars .  DME owned (not currently used): none.        Objective:     Communicated with RN, niece and sister at   prior to session.  Patient found supine with peripheral IV, bed alarm  upon PT entry to room. OT also present.      General Precautions: Standard, fall   Orthopedic Precautions:Full weight bearing   Braces:    Respiratory Status: Room air    Exams:  Gross Motor Coordination:  unable to fully assess due to cognition  RLE ROM: Deficits: noted slight limitations with knee extension  RLE Strength: patient able to actively move through available range and support weight in standing.   LLE ROM: Deficits: limited knee extension  LLE Strength: patient able to actively move through available ROM and support weight during standing and pivot transfers.     Functional Mobility:     Assist Level Assistive Device  Comments    Bed Mobility Min A     Sit to stand/Stand to sit Min A  Assist with pulling up pants.   Transfers Min - mod A Gait belt and HHA Able to transfer from sitting EOB to wheelchair via stand pivot transfer.    Gait NONE  Pt non-ambulatory per family   Balance Training      Wheelchair Mobility SBA  Patient demonstrates ability to lock/unlock brakes and propels self in room with use of LE's       Assessment:     Sincere Anderson is a 63 y.o. male admitted with a medical diagnosis of Physical deconditioning.  He presents with the following impairments/functional limitations:  impaired self care skills, impaired functional mobility, decreased safety awareness, pain Pt has cognitive deficits and has been non-ambulatory throughout life per family. He has been living with one of his sister's who is reportedly unable to offer level of care patient requires at this time. In agreement with NH placement at this time. Patient has chronic knee pain and new onset complaints of back pain since fall.  No acute findings on scans/xray.  Pt demonstrates min/mod A with transfers from bed to w/c, which is close to baseline functional level.    Rehab Prognosis:  Pt with limited mobility at baseline .  Fair overall potential to return to baseline functional level.     Additional information: Sister reports she is unable to provide support patient needs at home at this time.      Patient left up in chair with all lines intact, bed alarm on, RN/ CNA notified, and sister and niece present.      Plan:     During this hospitalization, patient to be seen 3 x/week to address the identified rehab impairments via therapeutic activities and progress toward goals. Will return again this afternoon to assist back to bed. Should he remain min A with transfers, potential to defer treatment to OT for work on ADL's.     GOALS:   Multidisciplinary Problems       Physical Therapy Goals          Problem: Physical Therapy    Goal Priority Disciplines  Outcome Goal Variances Interventions   Physical Therapy Goal     PT, PT/OT      Description: Goals to be met by: 24     Patient will increase functional independence with mobility by performin. Supine to sit with Stand-by Assistance  2. Sit to supine with MInimal Assistance  3. Bed to chair transfer with Minimal Assistance using No Assistive Device                         Recommendations:     Discharge Recommendations:   nursing home for long term care  Discharge Equipment Recommendations:  none     Time Tracking:       PT Start Time: 845     PT Stop Time: 905  PT Total Time (min): 20 min     Billable Minutes: Evaluation 20 minutes      2024

## 2024-02-12 NOTE — SUBJECTIVE & OBJECTIVE
Interval History:     Review of Systems   Unable to perform ROS: Psychiatric disorder     Objective:     Vital Signs (Most Recent):  Temp: 97.5 °F (36.4 °C) (02/12/24 0804)  Pulse: (!) 125 (02/12/24 0804)  Resp: 20 (02/12/24 0804)  BP: (!) 175/88 (02/12/24 0804)  SpO2: 98 % (02/12/24 0804) Vital Signs (24h Range):  Temp:  [97.5 °F (36.4 °C)-98.9 °F (37.2 °C)] 97.5 °F (36.4 °C)  Pulse:  [] 125  Resp:  [18-20] 20  SpO2:  [97 %-99 %] 98 %  BP: (114-175)/(69-88) 175/88     Weight: 89.9 kg (198 lb 3.1 oz)  Body mass index is 26.88 kg/m².    Intake/Output Summary (Last 24 hours) at 2/12/2024 1104  Last data filed at 2/12/2024 0800  Gross per 24 hour   Intake 1140 ml   Output 1800 ml   Net -660 ml         Physical Exam  Constitutional:       General: He is awake.      Appearance: Normal appearance. He is normal weight.   HENT:      Head: Normocephalic and atraumatic.      Nose: Nose normal.      Mouth/Throat:      Mouth: Mucous membranes are moist.      Pharynx: Oropharynx is clear.   Eyes:      Extraocular Movements: Extraocular movements intact.      Conjunctiva/sclera: Conjunctivae normal.      Pupils: Pupils are equal, round, and reactive to light.   Cardiovascular:      Rate and Rhythm: Normal rate and regular rhythm.      Pulses: Normal pulses.      Heart sounds: Normal heart sounds.   Pulmonary:      Effort: Pulmonary effort is normal.      Breath sounds: Normal breath sounds.   Abdominal:      General: Bowel sounds are normal.      Palpations: Abdomen is soft.   Musculoskeletal:         General: Normal range of motion.      Cervical back: Normal range of motion and neck supple.   Skin:     General: Skin is warm and dry.      Capillary Refill: Capillary refill takes 2 to 3 seconds.   Neurological:      General: No focal deficit present.      Mental Status: Mental status is at baseline.   Psychiatric:         Mood and Affect: Mood is elated.         Speech: Speech is rapid and pressured and delayed.          "Behavior: Behavior is cooperative.         Cognition and Memory: Cognition is impaired. Memory is impaired.             Significant Labs: All pertinent labs within the past 24 hours have been reviewed.  BMP:   Recent Labs   Lab 02/11/24  1205      K 4.1   CO2 26   BUN 34.0*   CREATININE 0.76   CALCIUM 9.2     CBC:   Recent Labs   Lab 02/11/24  1205   WBC 5.87   HGB 12.1*   HCT 37.2*        CMP:   Recent Labs   Lab 02/11/24  1205      K 4.1   CO2 26   BUN 34.0*   CREATININE 0.76   CALCIUM 9.2   ALBUMIN 3.8   BILITOT 0.2   ALKPHOS 115   AST 15   ALT 12     Magnesium: No results for input(s): "MG" in the last 48 hours.    Significant Imaging: I have reviewed all pertinent imaging results/findings within the past 24 hours.  "

## 2024-02-12 NOTE — HOSPITAL COURSE
2/12/24-Patient is resting at this time.  His night dose of Dilantin was not given because it fell off list so an extra dose was given this morning.  He is have some mild seizure like activity.  Will closely monitor.    2/13/24-No issues today.  We are waiting for placement to NH.  CM should be back tomorrow.    2/14/24-Patient is doing good.  Still waiting for NH placement.    2/15/24-No new issues today.  The patient is stable.  Attempting to get into NH.  The family can no longer meet his needs at home therefore I believe he needs NH placement.      2/16/24-Patient is doing good at this time.  Waiting for placement.    2/17/24-NO new issues today.  Family state he slept good last night .  Will continue waiting for placement.      2/18/24: Pt sitting up in bedside chair watching tv. He is smiling and laughing. Waiting on placement. Denies any CP, sob, pain anywhere. No acute events overnight.    2/19/24: Pt sitting up in bedside chair. He has no complaints. Niece is present. She states the pt is excited to go to his new room in the NH. We are waiting on paperwork from the state. The NH did accept him.     2/20/24-No issues at this time.  Waiting on the [facility] to approve him going to NH.  He has been accepted.    2/20/24-Patient has been accepted to NH .  He will be transferred in stable condition.  Exam(awake, RRR, CTA, BS +, NTTP, ROM I)

## 2024-02-12 NOTE — PT/OT/SLP EVAL
Occupational Therapy   Acute Care Evaluation    Name: Sincere Anderson  MRN: [id number]  Admitting Diagnosis:  Physical deconditioning      History:     As per chart review:     Fall       Unwitnessed fall last Saturday.  Knees are very weak and reports back pain since fall.          HPI: 64 yo male presents to the ED with weakness.  He fell last week and has been more weak and c/o back pain since.  As per the caregiver he is usually able to get up to assist with ADL's but now he can not.  His main caregiver was his mother but she passed away which then fell to his siter and she passed away last year.  The sister who is caring for him now is not able to care or him now since he requires more heavy lifting since his fall.  Imaging of back with X-ray and CT did not show any acute findings.  Labs were obtained which were stable.  No N/V/D/C.  No fever/chills.  No chest pain/SOB.  He does have some bruising from his falls. Due to inability to be cared for the family brought him in for possible placement.  Will get PT/OT involved as well to address his weakness.  It appears he can more all extremities.  He does have mental disability and cannot choice for himself.  He will be admitted for failure to thrive, therapy and possible placement to NH.    Past Medical History:   Diagnosis Date    Mental disability     Seizures     Thyroid disease          Past Surgical History:   Procedure Laterality Date    CHOLECYSTECTOMY           Subjective     Chief Complaint: Pt indicating left knee and lower back discomfort.  Patient/Family Comments/goals: Pt poor historian.  Family's goal is for pt to increase functional transfers to decrease burden of care.    Occupational Profile:  Lives with: Sister and has other family members who assist when able.  Home Environment: [facility]  Previous level of function: Niece reported that prior to recent events, pt was able to perform functional transfers with SBA/HHA.  He has been non ambulatory for 20  years and was able to propel his wc with BLEs for mobility.  Niece reports pt was able to feed himself, perform oral care, wash his face/ upper trunk with setup and verbal cuing.  Assistance was required with remaining ADL activities.  Equipment Used at Home:  grab bar, bath bench, wheelchair, raised toilet      Objective:     Communicated with: RN prior to session.  Patient found supine with peripheral IV, bed alarm upon OT entry to room.    General Precautions: Standard, fall   Orthopedic Precautions:Full weight bearing   Braces: N/A  Respiratory Status: Room air    Occupational Performance:    Mobility  Assist level Comments    Bed mobility Min Transfer training sit to supine min assist with upper trunk with HOB elevated.   Balance training     Transfer Mod Transfer training bed to wc mod assist HHA x people step to pivot   Functional mobility     Sit to stand transitions Min    Other:         Activities of Daily Living Assist level Comments    Feeding Min    Grooming/hygiene Mod     Bathing     Upper body dressing     Lower body dressing     Toileting Dep Incont urine   Toilet transfer     Adaptive equipment training     Other:       Upper Extremity Strength:  Right Upper Extremity:  Unable to accurately assess secondary to pt having difficulty understanding verbal commands.  Left Upper Extremity:  Unable to accurately assess secondary to pt having difficulty understanding verbal commands.    Cognitive/Visual Perceptual:  Cognitive/Psychosocial Skills:     -Pt with cognitive deficits, able to follow 1 step commands with cuing.      Treatment & Education:  Discussed scope and goals of OT with family.  Educated on call bell function and call before you fall.      Assessment:     Sincere Anderson is a 63 y.o. male with a medical diagnosis of Physical deconditioning.  He presents with decreased functional transfers and ADL ability secondary to recent fall with functional decline.  Family unable to care for him and is  seeking placement.  Performance deficits affecting function: impaired functional mobility, impaired cognition, pain, impaired balance, impaired self care skills.      Rehab Prognosis: Fair; patient would benefit from acute skilled OT services to address these deficits and reach maximum level of function.     Pain/Comfort:  Pain Rating 1:  (Pt with decreased cognition unable to rate pain, but does indicate pain in left knee and back.)  Pain Addressed 1: Pre-medicate for activity, Reposition, Distraction    Blood Pressure:        Plan:     Patient to be seen 2 x/week to address the above listed problems via self-care/home management, therapeutic activities  Plan of Care Reviewed with: patient, sibling      GOALS:   Multidisciplinary Problems       Occupational Therapy Goals          Problem: Occupational Therapy    Goal Priority Disciplines Outcome Interventions   Occupational Therapy Goal     OT, PT/OT     Description:   Patient will increase functional independence with ADLs by performing:    Feeding with Set-up Assistance.  Grooming while seated with Contact Guard Assistance.  Toilet transfer to bedside commode with Contact Guard Assistance.                             Patient left up in chair with all lines intact, call button in reach, chair alarm on, and niece/sister present      Recommendations:     Discharge Recommendations:    Discharge Equipment Recommendations:   (TBD)  Barriers to discharge:  Decreased caregiver support      Time Tracking:     OT Date of Treatment: 02/12/24  OT Start Time: 0825  OT Stop Time: 0900  OT Total Time (min): 35 min    Billable Minutes:Evaluation 35      2/12/2024

## 2024-02-12 NOTE — PROGRESS NOTES
[facility] - Medical Surgical Unit  [facility] Medicine  Progress Note    Patient Name: Sincere Anderson  MRN: [id number]  Patient Class: IP- Inpatient   Admission Date: 2/11/2024  Length of Stay: 1 days  Attending Physician: Milton Vega MD  Primary Care Provider: Meeks, Claude H., MD        Subjective:     Principal Problem:Physical deconditioning        HPI:  62 yo male presents to the ED with weakness.  He fell last week and has been more weak and c/o back pain since.  As per the caregiver he is usually able to get up to assist with ADL's but now he can not.  His main caregiver was his mother but she passed away which then fell to his siter and she passed away last year.  The sister who is caring for him now is not able to care or him now since he requires more heavy lifting since his fall.  Imaging of back with X-ray and CT did not show any acute findings.  Labs were obtained which were stable.  No N/V/D/C.  No fever/chills.  No chest pain/SOB.  He does have some bruising from his falls. Due to inability to be cared for the family brought him in for possible placement.  Will get PT/OT involved as well to address his weakness.  It appears he can more all extremities.  He does have mental disability and cannot choice for himself.  He will be admitted for failure to thrive, therapy and possible placement to NH.    Overview/Hospital Course:  2/12/24-Patient is resting at this time.  His night dose of Dilantin was not given because it fell off list so an extra dose was given this morning.  He is have some mild seizure like activity.  Will closely monitor.    Interval History:     Review of Systems   Unable to perform ROS: Psychiatric disorder     Objective:     Vital Signs (Most Recent):  Temp: 97.5 °F (36.4 °C) (02/12/24 0804)  Pulse: (!) 125 (02/12/24 0804)  Resp: 20 (02/12/24 0804)  BP: (!) 175/88 (02/12/24 0804)  SpO2: 98 % (02/12/24 0804) Vital Signs (24h Range):  Temp:  [97.5 °F (36.4 °C)-98.9 °F  (37.2 °C)] 97.5 °F (36.4 °C)  Pulse:  [] 125  Resp:  [18-20] 20  SpO2:  [97 %-99 %] 98 %  BP: (114-175)/(69-88) 175/88     Weight: 89.9 kg (198 lb 3.1 oz)  Body mass index is 26.88 kg/m².    Intake/Output Summary (Last 24 hours) at 2/12/2024 1104  Last data filed at 2/12/2024 0800  Gross per 24 hour   Intake 1140 ml   Output 1800 ml   Net -660 ml         Physical Exam  Constitutional:       General: He is awake.      Appearance: Normal appearance. He is normal weight.   HENT:      Head: Normocephalic and atraumatic.      Nose: Nose normal.      Mouth/Throat:      Mouth: Mucous membranes are moist.      Pharynx: Oropharynx is clear.   Eyes:      Extraocular Movements: Extraocular movements intact.      Conjunctiva/sclera: Conjunctivae normal.      Pupils: Pupils are equal, round, and reactive to light.   Cardiovascular:      Rate and Rhythm: Normal rate and regular rhythm.      Pulses: Normal pulses.      Heart sounds: Normal heart sounds.   Pulmonary:      Effort: Pulmonary effort is normal.      Breath sounds: Normal breath sounds.   Abdominal:      General: Bowel sounds are normal.      Palpations: Abdomen is soft.   Musculoskeletal:         General: Normal range of motion.      Cervical back: Normal range of motion and neck supple.   Skin:     General: Skin is warm and dry.      Capillary Refill: Capillary refill takes 2 to 3 seconds.   Neurological:      General: No focal deficit present.      Mental Status: Mental status is at baseline.   Psychiatric:         Mood and Affect: Mood is elated.         Speech: Speech is rapid and pressured and delayed.         Behavior: Behavior is cooperative.         Cognition and Memory: Cognition is impaired. Memory is impaired.             Significant Labs: All pertinent labs within the past 24 hours have been reviewed.  BMP:   Recent Labs   Lab 02/11/24  1205      K 4.1   CO2 26   BUN 34.0*   CREATININE 0.76   CALCIUM 9.2     CBC:   Recent Labs   Lab  "02/11/24  1205   WBC 5.87   HGB 12.1*   HCT 37.2*        CMP:   Recent Labs   Lab 02/11/24  1205      K 4.1   CO2 26   BUN 34.0*   CREATININE 0.76   CALCIUM 9.2   ALBUMIN 3.8   BILITOT 0.2   ALKPHOS 115   AST 15   ALT 12     Magnesium: No results for input(s): "MG" in the last 48 hours.    Significant Imaging: I have reviewed all pertinent imaging results/findings within the past 24 hours.    Assessment/Plan:      * Physical deconditioning  Admit to inpatient  Consult PT/OT  Follow labs  Resume home meds  CM for placement      Mental developmental delay  Review and resume home meds      Failure to thrive in adult  Therapy  NH placement      Seizure disorder  Resume home meds  Closely monitor        VTE Risk Mitigation (From admission, onward)           Ordered     IP VTE LOW RISK PATIENT  Once         02/11/24 1334     Place RACHEL hose  Until discontinued         02/11/24 1334     Place sequential compression device  Until discontinued         02/11/24 1334                  Therapy  Monitor for seizures  OOB  CM for placement  Discharge Planning   IRAM:      Code Status: Full Code   Is the patient medically ready for discharge?:     Reason for patient still in hospital (select all that apply): Patient trending condition, Treatment, Consult recommendations, and PT / OT recommendations                     Milton Vega MD  Department of Hospital Medicine   [facility]    "

## 2024-02-13 PROCEDURE — 25000003 PHARM REV CODE 250: Performed by: INTERNAL MEDICINE

## 2024-02-13 PROCEDURE — 11000001 HC ACUTE MED/SURG PRIVATE ROOM

## 2024-02-13 PROCEDURE — 25000003 PHARM REV CODE 250: Performed by: GENERAL PRACTICE

## 2024-02-13 PROCEDURE — 94761 N-INVAS EAR/PLS OXIMETRY MLT: CPT

## 2024-02-13 RX ORDER — PHENYTOIN SODIUM 100 MG/1
100 CAPSULE, EXTENDED RELEASE ORAL DAILY
Status: DISCONTINUED | OUTPATIENT
Start: 2024-02-14 | End: 2024-02-13

## 2024-02-13 RX ADMIN — Medication 6 MG: at 08:02

## 2024-02-13 RX ADMIN — RISPERIDONE 1 MG: 1 TABLET ORAL at 08:02

## 2024-02-13 RX ADMIN — SENNOSIDES AND DOCUSATE SODIUM 1 TABLET: 50; 8.6 TABLET ORAL at 08:02

## 2024-02-13 RX ADMIN — PHENYTOIN SODIUM 100 MG: 100 CAPSULE ORAL at 09:02

## 2024-02-13 RX ADMIN — LEVOTHYROXINE SODIUM 50 MCG: 0.05 TABLET ORAL at 05:02

## 2024-02-13 RX ADMIN — PHENOBARBITAL 129.6 MG: 32.4 TABLET ORAL at 08:02

## 2024-02-13 NOTE — SUBJECTIVE & OBJECTIVE
Interval History:     Review of Systems   Constitutional: Negative.    HENT: Negative.     Eyes: Negative.    Respiratory: Negative.     Cardiovascular: Negative.    Gastrointestinal: Negative.    Endocrine: Negative.    Genitourinary: Negative.    Musculoskeletal: Negative.    Skin: Negative.    Allergic/Immunologic: Negative.    Neurological: Negative.    Hematological: Negative.    Psychiatric/Behavioral:  Positive for confusion.      Objective:     Vital Signs (Most Recent):  Temp: 99.1 °F (37.3 °C) (02/13/24 0800)  Pulse: 76 (02/13/24 0800)  Resp: 18 (02/13/24 0800)  BP: 101/64 (02/13/24 0800)  SpO2: 97 % (02/13/24 0800) Vital Signs (24h Range):  Temp:  [97.6 °F (36.4 °C)-101.1 °F (38.4 °C)] 99.1 °F (37.3 °C)  Pulse:  [] 76  Resp:  [18-20] 18  SpO2:  [91 %-97 %] 97 %  BP: ()/(60-70) 101/64     Weight: 89.9 kg (198 lb 3.1 oz)  Body mass index is 26.88 kg/m².    Intake/Output Summary (Last 24 hours) at 2/13/2024 1052  Last data filed at 2/13/2024 0625  Gross per 24 hour   Intake 935 ml   Output 675 ml   Net 260 ml         Physical Exam  Constitutional:       Appearance: Normal appearance. He is normal weight.   HENT:      Head: Normocephalic and atraumatic.      Nose: Nose normal.      Mouth/Throat:      Mouth: Mucous membranes are moist.      Pharynx: Oropharynx is clear.   Eyes:      Extraocular Movements: Extraocular movements intact.      Conjunctiva/sclera: Conjunctivae normal.      Pupils: Pupils are equal, round, and reactive to light.   Cardiovascular:      Rate and Rhythm: Normal rate and regular rhythm.      Pulses: Normal pulses.      Heart sounds: Normal heart sounds.   Pulmonary:      Effort: Pulmonary effort is normal.      Breath sounds: Normal breath sounds.   Abdominal:      General: Bowel sounds are normal.      Palpations: Abdomen is soft.   Musculoskeletal:         General: Normal range of motion.      Cervical back: Normal range of motion and neck supple.   Skin:     General: Skin  "is warm and dry.      Capillary Refill: Capillary refill takes 2 to 3 seconds.   Neurological:      General: No focal deficit present.      Mental Status: He is alert. Mental status is at baseline.   Psychiatric:         Attention and Perception: Attention normal.         Mood and Affect: Mood is elated.         Speech: Speech is delayed.         Cognition and Memory: Cognition is impaired. Memory is impaired.             Significant Labs: All pertinent labs within the past 24 hours have been reviewed.  BMP:   Recent Labs   Lab 02/11/24  1205      K 4.1   CO2 26   BUN 34.0*   CREATININE 0.76   CALCIUM 9.2     CBC:   Recent Labs   Lab 02/11/24  1205   WBC 5.87   HGB 12.1*   HCT 37.2*        CMP:   Recent Labs   Lab 02/11/24  1205      K 4.1   CO2 26   BUN 34.0*   CREATININE 0.76   CALCIUM 9.2   ALBUMIN 3.8   BILITOT 0.2   ALKPHOS 115   AST 15   ALT 12     Magnesium: No results for input(s): "MG" in the last 48 hours.    Significant Imaging: I have reviewed all pertinent imaging results/findings within the past 24 hours.  "

## 2024-02-13 NOTE — PLAN OF CARE
Problem: Adult Inpatient Plan of Care  Goal: Plan of Care Review  Outcome: Ongoing, Progressing  Goal: Patient-Specific Goal (Individualized)  Outcome: Ongoing, Progressing  Goal: Absence of Hospital-Acquired Illness or Injury  Outcome: Ongoing, Progressing  Goal: Optimal Comfort and Wellbeing  Outcome: Ongoing, Progressing  Goal: Readiness for Transition of Care  Outcome: Ongoing, Progressing     Problem: Seizure, Active Management  Goal: Absence of Seizure/Seizure-Related Injury  Outcome: Ongoing, Progressing     Problem: Pain Acute  Goal: Acceptable Pain Control and Functional Ability  Outcome: Ongoing, Progressing     Problem: Fall Injury Risk  Goal: Absence of Fall and Fall-Related Injury  Outcome: Ongoing, Progressing     Problem: Skin Injury Risk Increased  Goal: Skin Health and Integrity  Outcome: Ongoing, Progressing     Problem: Cognitive Impairment (Functional Deficit)  Goal: Optimal Functional [place]  Outcome: Ongoing, Progressing     Problem: Mobility Impairment  Goal: Optimal Mobility  Outcome: Ongoing, Progressing

## 2024-02-13 NOTE — PROGRESS NOTES
[facility]  [facility] Medicine  Progress Note    Patient Name: Sincere Anderson  MRN: [id number]  Patient Class: IP- Inpatient   Admission Date: 2/11/2024  Length of Stay: 2 days  Attending Physician: Milton Vega MD  Primary Care Provider: Meeks, Claude H., MD        Subjective:     Principal Problem:Physical deconditioning        HPI:  64 yo male presents to the ED with weakness.  He fell last week and has been more weak and c/o back pain since.  As per the caregiver he is usually able to get up to assist with ADL's but now he can not.  His main caregiver was his mother but she passed away which then fell to his siter and she passed away last year.  The sister who is caring for him now is not able to care or him now since he requires more heavy lifting since his fall.  Imaging of back with X-ray and CT did not show any acute findings.  Labs were obtained which were stable.  No N/V/D/C.  No fever/chills.  No chest pain/SOB.  He does have some bruising from his falls. Due to inability to be cared for the family brought him in for possible placement.  Will get PT/OT involved as well to address his weakness.  It appears he can more all extremities.  He does have mental disability and cannot choice for himself.  He will be admitted for failure to thrive, therapy and possible placement to NH.    Overview/Hospital Course:  2/12/24-Patient is resting at this time.  His night dose of Dilantin was not given because it fell off list so an extra dose was given this morning.  He is have some mild seizure like activity.  Will closely monitor.    2/13/24-No issues today.  We are waiting for placement to NH.  CM should be back tomorrow.    Interval History:     Review of Systems   Constitutional: Negative.    HENT: Negative.     Eyes: Negative.    Respiratory: Negative.     Cardiovascular: Negative.    Gastrointestinal: Negative.    Endocrine: Negative.    Genitourinary: Negative.     Musculoskeletal: Negative.    Skin: Negative.    Allergic/Immunologic: Negative.    Neurological: Negative.    Hematological: Negative.    Psychiatric/Behavioral:  Positive for confusion.      Objective:     Vital Signs (Most Recent):  Temp: 99.1 °F (37.3 °C) (02/13/24 0800)  Pulse: 76 (02/13/24 0800)  Resp: 18 (02/13/24 0800)  BP: 101/64 (02/13/24 0800)  SpO2: 97 % (02/13/24 0800) Vital Signs (24h Range):  Temp:  [97.6 °F (36.4 °C)-101.1 °F (38.4 °C)] 99.1 °F (37.3 °C)  Pulse:  [] 76  Resp:  [18-20] 18  SpO2:  [91 %-97 %] 97 %  BP: ()/(60-70) 101/64     Weight: 89.9 kg (198 lb 3.1 oz)  Body mass index is 26.88 kg/m².    Intake/Output Summary (Last 24 hours) at 2/13/2024 1052  Last data filed at 2/13/2024 0625  Gross per 24 hour   Intake 935 ml   Output 675 ml   Net 260 ml         Physical Exam  Constitutional:       Appearance: Normal appearance. He is normal weight.   HENT:      Head: Normocephalic and atraumatic.      Nose: Nose normal.      Mouth/Throat:      Mouth: Mucous membranes are moist.      Pharynx: Oropharynx is clear.   Eyes:      Extraocular Movements: Extraocular movements intact.      Conjunctiva/sclera: Conjunctivae normal.      Pupils: Pupils are equal, round, and reactive to light.   Cardiovascular:      Rate and Rhythm: Normal rate and regular rhythm.      Pulses: Normal pulses.      Heart sounds: Normal heart sounds.   Pulmonary:      Effort: Pulmonary effort is normal.      Breath sounds: Normal breath sounds.   Abdominal:      General: Bowel sounds are normal.      Palpations: Abdomen is soft.   Musculoskeletal:         General: Normal range of motion.      Cervical back: Normal range of motion and neck supple.   Skin:     General: Skin is warm and dry.      Capillary Refill: Capillary refill takes 2 to 3 seconds.   Neurological:      General: No focal deficit present.      Mental Status: He is alert. Mental status is at baseline.   Psychiatric:         Attention and Perception:  "Attention normal.         Mood and Affect: Mood is elated.         Speech: Speech is delayed.         Cognition and Memory: Cognition is impaired. Memory is impaired.             Significant Labs: All pertinent labs within the past 24 hours have been reviewed.  BMP:   Recent Labs   Lab 02/11/24  1205      K 4.1   CO2 26   BUN 34.0*   CREATININE 0.76   CALCIUM 9.2     CBC:   Recent Labs   Lab 02/11/24  1205   WBC 5.87   HGB 12.1*   HCT 37.2*        CMP:   Recent Labs   Lab 02/11/24  1205      K 4.1   CO2 26   BUN 34.0*   CREATININE 0.76   CALCIUM 9.2   ALBUMIN 3.8   BILITOT 0.2   ALKPHOS 115   AST 15   ALT 12     Magnesium: No results for input(s): "MG" in the last 48 hours.    Significant Imaging: I have reviewed all pertinent imaging results/findings within the past 24 hours.    Assessment/Plan:      * Physical deconditioning  Admit to inpatient  Consult PT/OT  Follow labs  Resume home meds  CM for placement      Mental developmental delay  Review and resume home meds      Failure to thrive in adult  Therapy  NH placement      Seizure disorder  Resume home meds  Closely monitor        VTE Risk Mitigation (From admission, onward)           Ordered     IP VTE LOW RISK PATIENT  Once         02/11/24 1334     Place RACHEL hose  Until discontinued         02/11/24 1334     Place sequential compression device  Until discontinued         02/11/24 1334                Check seizure med levels in am  CM for placement    Discharge Planning   IRAM:      Code Status: Full Code   Is the patient medically ready for discharge?:     Reason for patient still in hospital (select all that apply): Patient trending condition, Laboratory test, Treatment, Consult recommendations, PT / OT recommendations, and Pending disposition                     Milton Vega MD  Department of Hospital Medicine   [facility]    "

## 2024-02-13 NOTE — PLAN OF CARE
Problem: Adult Inpatient Plan of Care  Goal: Plan of Care Review  Outcome: Ongoing, Progressing  Goal: Patient-Specific Goal (Individualized)  Outcome: Ongoing, Progressing  Goal: Absence of Hospital-Acquired Illness or Injury  Outcome: Ongoing, Progressing  Goal: Optimal Comfort and Wellbeing  Outcome: Ongoing, Progressing  Goal: Readiness for Transition of Care  Outcome: Ongoing, Progressing     Problem: Seizure, Active Management  Goal: Absence of Seizure/Seizure-Related Injury  Outcome: Ongoing, Progressing     Problem: Pain Acute  Goal: Acceptable Pain Control and Functional Ability  Outcome: Ongoing, Progressing     Problem: Fall Injury Risk  Goal: Absence of Fall and Fall-Related Injury  Outcome: Ongoing, Progressing     Problem: Skin Injury Risk Increased  Goal: Skin Health and Integrity  Outcome: Ongoing, Progressing     Problem: Cognitive Impairment (Functional Deficit)  Goal: Optimal Functional [place]  Outcome: Ongoing, Progressing     Problem: Mobility Impairment  Goal: Optimal Mobility  Outcome: Ongoing, Progressing

## 2024-02-14 LAB
PHENOBARB SERPL-MCNC: 23.7 UG/ML (ref 15–40)
PHENYTOIN SERPL-MCNC: 13.2 UG/ML (ref 10–20)
SARS-COV-2 RNA RESP QL NAA+PROBE: NOT DETECTED

## 2024-02-14 PROCEDURE — 94761 N-INVAS EAR/PLS OXIMETRY MLT: CPT

## 2024-02-14 PROCEDURE — 97530 THERAPEUTIC ACTIVITIES: CPT

## 2024-02-14 PROCEDURE — 80185 ASSAY OF PHENYTOIN TOTAL: CPT | Performed by: INTERNAL MEDICINE

## 2024-02-14 PROCEDURE — 80184 ASSAY OF PHENOBARBITAL: CPT | Performed by: INTERNAL MEDICINE

## 2024-02-14 PROCEDURE — 11000001 HC ACUTE MED/SURG PRIVATE ROOM

## 2024-02-14 PROCEDURE — 97535 SELF CARE MNGMENT TRAINING: CPT

## 2024-02-14 PROCEDURE — 25000003 PHARM REV CODE 250: Performed by: INTERNAL MEDICINE

## 2024-02-14 PROCEDURE — 87635 SARS-COV-2 COVID-19 AMP PRB: CPT | Performed by: INTERNAL MEDICINE

## 2024-02-14 PROCEDURE — 25000003 PHARM REV CODE 250: Performed by: GENERAL PRACTICE

## 2024-02-14 RX ORDER — BENZONATATE 100 MG/1
100 CAPSULE ORAL 3 TIMES DAILY PRN
Status: DISCONTINUED | OUTPATIENT
Start: 2024-02-14 | End: 2024-02-20 | Stop reason: HOSPADM

## 2024-02-14 RX ORDER — DICLOFENAC SODIUM 10 MG/G
2 GEL TOPICAL 2 TIMES DAILY
Status: DISCONTINUED | OUTPATIENT
Start: 2024-02-14 | End: 2024-02-20 | Stop reason: HOSPADM

## 2024-02-14 RX ADMIN — BENZONATATE 100 MG: 100 CAPSULE ORAL at 09:02

## 2024-02-14 RX ADMIN — RISPERIDONE 1 MG: 1 TABLET ORAL at 09:02

## 2024-02-14 RX ADMIN — ACETAMINOPHEN 650 MG: 325 TABLET, FILM COATED ORAL at 06:02

## 2024-02-14 RX ADMIN — PHENOBARBITAL 129.6 MG: 32.4 TABLET ORAL at 09:02

## 2024-02-14 RX ADMIN — RISPERIDONE 1 MG: 1 TABLET ORAL at 08:02

## 2024-02-14 RX ADMIN — SENNOSIDES AND DOCUSATE SODIUM 1 TABLET: 50; 8.6 TABLET ORAL at 08:02

## 2024-02-14 RX ADMIN — DICLOFENAC SODIUM TOPICAL GEL, 1%, 2 G: 10 GEL TOPICAL at 09:02

## 2024-02-14 RX ADMIN — ACETAMINOPHEN 650 MG: 325 TABLET, FILM COATED ORAL at 09:02

## 2024-02-14 RX ADMIN — SENNOSIDES AND DOCUSATE SODIUM 1 TABLET: 50; 8.6 TABLET ORAL at 09:02

## 2024-02-14 RX ADMIN — QUETIAPINE FUMARATE 50 MG: 25 TABLET ORAL at 09:02

## 2024-02-14 RX ADMIN — DICLOFENAC SODIUM TOPICAL GEL, 1%, 2 G: 10 GEL TOPICAL at 11:02

## 2024-02-14 RX ADMIN — ACETAMINOPHEN 650 MG: 325 TABLET, FILM COATED ORAL at 08:02

## 2024-02-14 RX ADMIN — LEVOTHYROXINE SODIUM 50 MCG: 0.05 TABLET ORAL at 06:02

## 2024-02-14 NOTE — PLAN OF CARE
Problem: Adult Inpatient Plan of Care  Goal: Plan of Care Review  Outcome: Ongoing, Progressing  Goal: Patient-Specific Goal (Individualized)  Outcome: Ongoing, Progressing  Goal: Absence of Hospital-Acquired Illness or Injury  Outcome: Ongoing, Progressing  Goal: Optimal Comfort and Wellbeing  Outcome: Ongoing, Progressing  Goal: Readiness for Transition of Care  Outcome: Ongoing, Progressing     Problem: Seizure, Active Management  Goal: Absence of Seizure/Seizure-Related Injury  Outcome: Ongoing, Progressing     Problem: Pain Acute  Goal: Acceptable Pain Control and Functional Ability  Outcome: Ongoing, Progressing     Problem: Fall Injury Risk  Goal: Absence of Fall and Fall-Related Injury  Outcome: Ongoing, Progressing     Problem: Skin Injury Risk Increased  Goal: Skin Health and Integrity  Outcome: Ongoing, Progressing     Problem: Cognitive Impairment (Functional Deficit)  Goal: Optimal Functional [place]  Outcome: Ongoing, Progressing     Problem: Mobility Impairment  Goal: Optimal Mobility  Outcome: Ongoing, Progressing     Problem: Communication Impairment  Goal: Effective Communication Skills  Outcome: Ongoing, Progressing

## 2024-02-14 NOTE — PROGRESS NOTES
Inpatient Nutrition Assessment    Admit Date: 2/11/2024   Total duration of encounter: 3 days   Patient Age: 63 y.o.    Nutrition Recommendation/Prescription     Continue regular, finger food diet as tolerated  Weigh weekly      Communication of Recommendations:  EMR    Nutrition Assessment     Malnutrition Assessment/Nutrition-Focused Physical Exam       Does not meet criteria     A minimum of two characteristics is recommended for diagnosis of either severe or non-severe malnutrition.    Chart Review    Reason Seen: follow-up    Malnutrition Screening Tool Results   Have you recently lost weight without trying?: No  Have you been eating poorly because of a decreased appetite?: No   MST Score: 0   Diagnosis:  Physical Deconditioning, mental development delay, FTT in adult, seizure d/o    Relevant Medical History: Seizures, Mental disability, thyroid disease    Scheduled Medications:  levothyroxine, 50 mcg, QAM  NON FORMULARY MEDICATION 100 mg, 100 mg, QAM  NON FORMULARY MEDICATION 180 mg, 180 mg, Daily  NON FORMULARY MEDICATION 200 mg, 200 mg, Daily PM  PHENobarbitaL, 129.6 mg, Nightly  risperiDONE, 1 mg, BID  senna-docusate 8.6-50 mg, 1 tablet, BID    Continuous Infusions:   PRN Medications: acetaminophen, melatonin, ondansetron, QUEtiapine, sodium chloride 0.9%    Calorie Containing IV Medications: no significant kcals from medications at this time    Recent Labs   Lab 02/11/24  1205      K 4.1   CALCIUM 9.2   CHLORIDE 108*   CO2 26   BUN 34.0*   CREATININE 0.76   EGFRNORACEVR >60   GLUCOSE 84   BILITOT 0.2   ALKPHOS 115   ALT 12   AST 15   ALBUMIN 3.8   WBC 5.87   HGB 12.1*   HCT 37.2*       Nutrition Orders:  Diet Adult Regular (bite sized)      Appetite/Oral Intake: good/% of meals  Factors Affecting Nutritional Intake: none identified  Food/Orthodoxy/Cultural Preferences: none reported  Food Allergies: none reported  Last Bowel Movement: 02/09/24  Wound(s):  Intact    Comments    (2/12) Pt with  good appetite and intake of lunch today (100% recorded). No difficulties chewing or swallowing. Denied N/V/C/D. UBW unknown.    (2/14) Pt with continued good appetite and intake (100% of most meals). No GI issues reported. Family stated it looks like he has gained some weight, but unsure of UBW. Denied wt loss. Pt awaiting placement.     Anthropometrics    Height: 6' (182.9 cm), Height Method: Stated  Last Weight: 89.9 kg (198 lb 3.1 oz) (02/11/24 0937), Weight Method: Bed Scale  BMI (Calculated): 26.9  BMI Classification: overweight (BMI 25-29.9)        Ideal Body Weight (IBW), Male: 178 lb     % Ideal Body Weight, Male (lb): 111.35 %                          Usual Weight Provided By: unable to obtain usual weight    Wt Readings from Last 5 Encounters:   02/11/24 89.9 kg (198 lb 3.1 oz)   03/17/23 84.8 kg (186 lb 15.2 oz)     Weight Change(s) Since Admission: new admit  Wt Readings from Last 1 Encounters:   02/11/24 0937 89.9 kg (198 lb 3.1 oz)   Admit Weight: 89.9 kg (198 lb 3.1 oz) (02/11/24 0937), Weight Method: Bed Scale    Estimated Needs    Weight Used For Calorie Calculations: 89.9 kg (198 lb 3.1 oz)  Energy Calorie Requirements (kcal): 1798 kcal (20 kcal/kg)  Energy Need Method: Kcal/kg  Weight Used For Protein Calculations: 89.9 kg (198 lb 3.1 oz)  Protein Requirements: 90 gm (1 gm/kg)  Fluid Requirements (mL): 1798 mL (1 mL/kcal)    Enteral Nutrition     Patient not receiving enteral nutrition at this time.    Parenteral Nutrition     Patient not receiving parenteral nutrition support at this time.    Evaluation of Received Nutrient Intake    Calories: meeting estimated needs  Protein: meeting estimated needs    Patient Education     Not applicable.    Nutrition Diagnosis   No nutrition dx at this time    Nutrition Interventions     Intervention(s): general/healthful diet    Goal: Maintain weight throughout hospitalization. (goal met)      Nutrition Goals & Monitoring     Dietitian will monitor: food and  beverage intake, weight, electrolyte/renal panel, glucose/endocrine profile, and gastrointestinal profile    Nutrition Risk/Follow-Up: low (follow-up in 5-7 days)   Please consult if re-assessment needed sooner.

## 2024-02-14 NOTE — PROGRESS NOTES
[facility]  [facility] Medicine  Progress Note    Patient Name: Sincere Anderson  MRN: [id number]  Patient Class: IP- Inpatient   Admission Date: 2/11/2024  Length of Stay: 3 days  Attending Physician: Milton Vega MD  Primary Care Provider: Meeks, Claude H., MD        Subjective:     Principal Problem:Physical deconditioning        HPI:  62 yo male presents to the ED with weakness.  He fell last week and has been more weak and c/o back pain since.  As per the caregiver he is usually able to get up to assist with ADL's but now he can not.  His main caregiver was his mother but she passed away which then fell to his siter and she passed away last year.  The sister who is caring for him now is not able to care or him now since he requires more heavy lifting since his fall.  Imaging of back with X-ray and CT did not show any acute findings.  Labs were obtained which were stable.  No N/V/D/C.  No fever/chills.  No chest pain/SOB.  He does have some bruising from his falls. Due to inability to be cared for the family brought him in for possible placement.  Will get PT/OT involved as well to address his weakness.  It appears he can more all extremities.  He does have mental disability and cannot choice for himself.  He will be admitted for failure to thrive, therapy and possible placement to NH.    Overview/Hospital Course:  2/12/24-Patient is resting at this time.  His night dose of Dilantin was not given because it fell off list so an extra dose was given this morning.  He is have some mild seizure like activity.  Will closely monitor.    2/13/24-No issues today.  We are waiting for placement to NH.  CM should be back tomorrow.    2/14/24-Patient is doing good.  Still waiting for NH placement.    Interval History:     Review of Systems   Unable to perform ROS: Psychiatric disorder     Objective:     Vital Signs (Most Recent):  Temp: 98 °F (36.7 °C) (02/14/24 0803)  Pulse: 78  (02/14/24 0803)  Resp: 18 (02/14/24 0803)  BP: 105/69 (02/14/24 0803)  SpO2: (!) 93 % (02/14/24 0803) Vital Signs (24h Range):  Temp:  [97.4 °F (36.3 °C)-99.1 °F (37.3 °C)] 98 °F (36.7 °C)  Pulse:  [78-92] 78  Resp:  [18-20] 18  SpO2:  [93 %-99 %] 93 %  BP: (105-127)/(64-76) 105/69     Weight: 89.9 kg (198 lb 3.1 oz)  Body mass index is 26.88 kg/m².    Intake/Output Summary (Last 24 hours) at 2/14/2024 1035  Last data filed at 2/14/2024 0800  Gross per 24 hour   Intake 600 ml   Output 625 ml   Net -25 ml         Physical Exam  Constitutional:       Appearance: Normal appearance. He is normal weight.   HENT:      Head: Normocephalic and atraumatic.      Nose: Nose normal.      Mouth/Throat:      Mouth: Mucous membranes are moist.      Pharynx: Oropharynx is clear.   Eyes:      Extraocular Movements: Extraocular movements intact.      Conjunctiva/sclera: Conjunctivae normal.      Pupils: Pupils are equal, round, and reactive to light.   Cardiovascular:      Rate and Rhythm: Normal rate and regular rhythm.      Pulses: Normal pulses.      Heart sounds: Normal heart sounds.   Pulmonary:      Effort: Pulmonary effort is normal.      Breath sounds: Normal breath sounds.   Abdominal:      General: Bowel sounds are normal.      Palpations: Abdomen is soft.   Musculoskeletal:         General: Normal range of motion.      Cervical back: Normal range of motion and neck supple.      Right lower leg: Edema present.      Left lower leg: Edema present.   Skin:     General: Skin is warm and dry.   Neurological:      General: No focal deficit present.      Mental Status: He is alert. Mental status is at baseline.   Psychiatric:         Mood and Affect: Mood is elated.         Speech: Speech is rapid and pressured.         Behavior: Behavior is cooperative.         Cognition and Memory: Cognition is impaired. Memory is impaired.             Significant Labs: All pertinent labs within the past 24 hours have been reviewed.  BMP: No  "results for input(s): "GLU", "NA", "K", "CL", "CO2", "BUN", "CREATININE", "CALCIUM", "MG" in the last 48 hours.  CBC: No results for input(s): "WBC", "HGB", "HCT", "PLT" in the last 48 hours.  CMP: No results for input(s): "NA", "K", "CL", "CO2", "GLU", "BUN", "CREATININE", "CALCIUM", "PROT", "ALBUMIN", "BILITOT", "ALKPHOS", "AST", "ALT", "ANIONGAP", "EGFRNONAA" in the last 48 hours.    Invalid input(s): "ESTGFAFR[place]"  Magnesium: No results for input(s): "MG" in the last 48 hours.    Significant Imaging: I have reviewed all pertinent imaging results/findings within the past 24 hours.    Assessment/Plan:      * Physical deconditioning  Admit to inpatient  Consult PT/OT  Follow labs  Resume home meds  CM for placement      Mental developmental delay  Review and resume home meds      Failure to thrive in adult  Therapy  NH placement      Seizure disorder  Resume home meds  Closely monitor        VTE Risk Mitigation (From admission, onward)           Ordered     IP VTE LOW RISK PATIENT  Once         02/11/24 1334     Place RACHEL hose  Until discontinued         02/11/24 1334     Place sequential compression device  Until discontinued         02/11/24 1334                  Waiting for placement  Therapy  OOB  Discharge Planning   IRAM:      Code Status: Full Code   Is the patient medically ready for discharge?:     Reason for patient still in hospital (select all that apply): Patient trending condition, PT / OT recommendations, and Pending disposition  Discharge Plan A: Skilled Nursing Facility                  Milton Vega MD  Department of Hospital Medicine   [facility]    "

## 2024-02-14 NOTE — PT/OT/SLP PROGRESS
Occupational Therapy  Treatment    Name: Snicere Anderson    : 1960 (63 y.o.)  MRN: [id number]          TREATMENT SUMMARY AND RECOMMENDATIONS:      Subjective Assessment:   No complaints  Lethargic    Awake, alert, cooperative  Uncooperative    Agitated  Flat affect    Appropriate  c/o fatigue   x Confused x Treated at bedside     Emotionally liable  Treated in gym/dept.   x Impulsive x Other:Pt with mental disability, perseverating on multiple topics.       Pain/Comfort:  Pain Rating 1:  (Pt with cognitive deficits unable to rate pain.)      Therapy Precautions:   x Cognitive deficits  Spinal precautions    Collar - hard  Sternal precautions    Collar - soft   TLSO   x Fall risk  LSO    Hip precautions - posterior  Knee immobilizer    Hip precautions - anterior  WBAT    Impaired communication  Partial weightbearing    Oxygen  TTWB    PEG tube  NWB    Visual deficits  Other:    Hearing deficits           Vitals Value   x Room air      Oxygen (L)     Blood pressure     Heart rate         Treatment Objectives:     Mobility Training:    Mobility task Assist level Comments    Bed mobility Min/Mod Transfer training supine to sit min assist with upper trunk with HOB elevated, pt required mod assist with LEs during transfer supine to sit.   Balance training     Transfer Mod Transfer training bed<> 2x's with mod assist stand pivot secondary to pt soiled self during first transfer.   Functional mobility     Sit to stand transitions     Other:       ADL Training:    ADL Assist level Comments    Feeding     Grooming/hygiene Mod ADL grooming activities performed sitting in .  After set up pt able to wash face with verbal cuing, mod assist with oral care using mouth wash and toothette secondary to pt having difficulty following verbal commands to clean all areas of mouth.  Tactile assist required to perform 50%   Bathing     Upper body dressing     Lower body dressing     Toileting Dep During transfer to  pt soiled  self.  Pt transferred back to supine and changed with help of niece. Min assist with rolling l/r 3x's   Toilet transfer     Adaptive equipment training     Other:         Additional Treatment:  Reinforced call bell function and call before you fall.    Assessment: Patient tolerated session poor.  Pt continues to require 24 hour care.  Awaiting NH placement    OT Plan: Cont POC      GOALS:   Multidisciplinary Problems       Occupational Therapy Goals          Problem: Occupational Therapy    Goal Priority Disciplines Outcome Interventions   Occupational Therapy Goal     OT, PT/OT Ongoing, Progressing    Description:   Patient will increase functional independence with ADLs by performing:    Feeding with Set-up Assistance.  Grooming while seated with Contact Guard Assistance.  Toilet transfer to bedside commode with Contact Guard Assistance.                         Communication with Treatment Team:     Discharge Recommendations:    Discharge Equipment Recommendations:   (TBD)  Barriers to discharge:  Decreased caregiver support      At end of treatment, patient remained:  x Up in chair     In bed   x With alarm activated    Bed rails up   x Call bell in reach    x Family/friends present    Restraints secured properly    In bathroom with CNA/RN notified    In gym with PT/PTA/tech   x Nurse aware    Other:         OT Date of Treatment: 02/14/24  OT Start Time: 0900  OT Stop Time: 0935  OT Total Time (min): 35 min    Billable Minutes:Self Care/Home Management 20  Therapeutic Activity 15      2/14/2024

## 2024-02-14 NOTE — PROGRESS NOTES
02/14/24 0918   Discharge Assessment   Assessment Type Discharge Planning Assessment   Confirmed/corrected address, phone number and insurance Yes   Confirmed Demographics Correct on Facesheet   Source of Information family   If unable to respond/provide information was family/caregiver contacted? Yes   Contact Name/Number Celia- [phone]   People in Home other relative(s)   Facility Arrived From: Home   Do you expect to return to your current living situation? No   Do you have help at home or someone to help you manage your care at home? No   Prior to hospitilization cognitive status: Unable to Assess   Current cognitive status: Unable to Assess   Walking or Climbing Stairs Difficulty yes   Walking or Climbing Stairs ambulation difficulty, dependent;stair climbing difficulty, dependent;transferring difficulty, dependent   Mobility Management Wheelchair   Dressing/Bathing Difficulty yes   Dressing/Bathing bathing difficulty, assistance 1 person;dressing difficulty, assistance 1 person   Dressing/Bathing Management Family & Caregivers   Home Accessibility wheelchair accessible   Equipment Currently Used at Home wheelchair;grab bar;bath bench   Readmission within 30 days? No   Patient currently being followed by outpatient case management? Unable to determine (comments)   Do you currently have service(s) that help you manage your care at home? Yes   Is the pt/caregiver preference to resume services with current agency No   Do you take prescription medications? Yes   Do you have prescription coverage? Yes   Coverage Medicare   Do you have any problems affording any of your prescribed medications? No   Is the patient taking medications as prescribed? yes   How do you get to doctors appointments? family or friend will provide   Are you on dialysis? No   Do you take coumadin? No   Discharge Plan A Skilled Nursing Facility   DME Needed Upon Discharge  none   Discharge Plan discussed with:   (Family)   Transition  of Care Barriers Nursing Home rejection;Other (see comments)

## 2024-02-14 NOTE — PLAN OF CARE
Problem: Adult Inpatient Plan of Care  Goal: Plan of Care Review  Outcome: Ongoing, Progressing  Goal: Patient-Specific Goal (Individualized)  Outcome: Ongoing, Progressing  Goal: Absence of Hospital-Acquired Illness or Injury  Outcome: Ongoing, Progressing  Goal: Optimal Comfort and Wellbeing  Outcome: Ongoing, Progressing  Goal: Readiness for Transition of Care  Outcome: Ongoing, Progressing     Problem: Seizure, Active Management  Goal: Absence of Seizure/Seizure-Related Injury  Outcome: Ongoing, Progressing     Problem: Pain Acute  Goal: Acceptable Pain Control and Functional Ability  Outcome: Ongoing, Progressing     Problem: Fall Injury Risk  Goal: Absence of Fall and Fall-Related Injury  Outcome: Ongoing, Progressing     Problem: Skin Injury Risk Increased  Goal: Skin Health and Integrity  Outcome: Ongoing, Progressing     Problem: Cognitive Impairment (Functional Deficit)  Goal: Optimal Functional [place]  Outcome: Ongoing, Progressing     Problem: Mobility Impairment  Goal: Optimal Mobility  Outcome: Ongoing, Progressing

## 2024-02-14 NOTE — PLAN OF CARE
Nursing Homes of Choice:  1: [facility]  2. [facility]  3. [facility].     Referral sent to all 3 via [facility].

## 2024-02-14 NOTE — SUBJECTIVE & OBJECTIVE
Interval History:     Review of Systems   Unable to perform ROS: Psychiatric disorder     Objective:     Vital Signs (Most Recent):  Temp: 98 °F (36.7 °C) (02/14/24 0803)  Pulse: 78 (02/14/24 0803)  Resp: 18 (02/14/24 0803)  BP: 105/69 (02/14/24 0803)  SpO2: (!) 93 % (02/14/24 0803) Vital Signs (24h Range):  Temp:  [97.4 °F (36.3 °C)-99.1 °F (37.3 °C)] 98 °F (36.7 °C)  Pulse:  [78-92] 78  Resp:  [18-20] 18  SpO2:  [93 %-99 %] 93 %  BP: (105-127)/(64-76) 105/69     Weight: 89.9 kg (198 lb 3.1 oz)  Body mass index is 26.88 kg/m².    Intake/Output Summary (Last 24 hours) at 2/14/2024 1035  Last data filed at 2/14/2024 0800  Gross per 24 hour   Intake 600 ml   Output 625 ml   Net -25 ml         Physical Exam  Constitutional:       Appearance: Normal appearance. He is normal weight.   HENT:      Head: Normocephalic and atraumatic.      Nose: Nose normal.      Mouth/Throat:      Mouth: Mucous membranes are moist.      Pharynx: Oropharynx is clear.   Eyes:      Extraocular Movements: Extraocular movements intact.      Conjunctiva/sclera: Conjunctivae normal.      Pupils: Pupils are equal, round, and reactive to light.   Cardiovascular:      Rate and Rhythm: Normal rate and regular rhythm.      Pulses: Normal pulses.      Heart sounds: Normal heart sounds.   Pulmonary:      Effort: Pulmonary effort is normal.      Breath sounds: Normal breath sounds.   Abdominal:      General: Bowel sounds are normal.      Palpations: Abdomen is soft.   Musculoskeletal:         General: Normal range of motion.      Cervical back: Normal range of motion and neck supple.      Right lower leg: Edema present.      Left lower leg: Edema present.   Skin:     General: Skin is warm and dry.   Neurological:      General: No focal deficit present.      Mental Status: He is alert. Mental status is at baseline.   Psychiatric:         Mood and Affect: Mood is elated.         Speech: Speech is rapid and pressured.         Behavior: Behavior is  "cooperative.         Cognition and Memory: Cognition is impaired. Memory is impaired.             Significant Labs: All pertinent labs within the past 24 hours have been reviewed.  BMP: No results for input(s): "GLU", "NA", "K", "CL", "CO2", "BUN", "CREATININE", "CALCIUM", "MG" in the last 48 hours.  CBC: No results for input(s): "WBC", "HGB", "HCT", "PLT" in the last 48 hours.  CMP: No results for input(s): "NA", "K", "CL", "CO2", "GLU", "BUN", "CREATININE", "CALCIUM", "PROT", "ALBUMIN", "BILITOT", "ALKPHOS", "AST", "ALT", "ANIONGAP", "EGFRNONAA" in the last 48 hours.    Invalid input(s): "ESTGFAFR[place]"  Magnesium: No results for input(s): "MG" in the last 48 hours.    Significant Imaging: I have reviewed all pertinent imaging results/findings within the past 24 hours.  "

## 2024-02-14 NOTE — PT/OT/SLP PROGRESS
Name: Sincere Anderson    : 1960 (63 y.o.)  MRN: [id number]           Patient Not Seen      Patient unable to be seen at this time secondary to: patient is already up in chair since this morning and seems content. Spoke with avel earlier today and she reported patient generally gets up first thing in the morning and stays I chair until after dinner.  Spoke with RN who reports they had not issues mobilizing patient yesterday. Will discharge from PT services and defer to OT for continuation of care. Nursing to assist with out of bed to chair during hospitalization as he awaits placement.

## 2024-02-15 PROCEDURE — 11000001 HC ACUTE MED/SURG PRIVATE ROOM

## 2024-02-15 PROCEDURE — 25000003 PHARM REV CODE 250: Performed by: GENERAL PRACTICE

## 2024-02-15 PROCEDURE — 94761 N-INVAS EAR/PLS OXIMETRY MLT: CPT

## 2024-02-15 PROCEDURE — 97530 THERAPEUTIC ACTIVITIES: CPT

## 2024-02-15 PROCEDURE — 25000003 PHARM REV CODE 250: Performed by: INTERNAL MEDICINE

## 2024-02-15 PROCEDURE — 97535 SELF CARE MNGMENT TRAINING: CPT

## 2024-02-15 RX ADMIN — RISPERIDONE 1 MG: 1 TABLET ORAL at 09:02

## 2024-02-15 RX ADMIN — Medication 6 MG: at 10:02

## 2024-02-15 RX ADMIN — QUETIAPINE FUMARATE 50 MG: 25 TABLET ORAL at 10:02

## 2024-02-15 RX ADMIN — LEVOTHYROXINE SODIUM 50 MCG: 0.05 TABLET ORAL at 05:02

## 2024-02-15 RX ADMIN — DICLOFENAC SODIUM TOPICAL GEL, 1%, 2 G: 10 GEL TOPICAL at 09:02

## 2024-02-15 RX ADMIN — PHENOBARBITAL 129.6 MG: 32.4 TABLET ORAL at 09:02

## 2024-02-15 NOTE — SUBJECTIVE & OBJECTIVE
Interval History:     Review of Systems   Unable to perform ROS: Psychiatric disorder     Objective:     Vital Signs (Most Recent):  Temp: 98 °F (36.7 °C) (02/15/24 0800)  Pulse: 82 (02/15/24 0800)  Resp: 20 (02/15/24 0800)  BP: 113/70 (02/15/24 0800)  SpO2: 96 % (02/15/24 0800) Vital Signs (24h Range):  Temp:  [97.4 °F (36.3 °C)-98.4 °F (36.9 °C)] 98 °F (36.7 °C)  Pulse:  [77-84] 82  Resp:  [16-20] 20  SpO2:  [96 %-99 %] 96 %  BP: (103-138)/(65-78) 113/70     Weight: 89.9 kg (198 lb 3.1 oz)  Body mass index is 26.88 kg/m².    Intake/Output Summary (Last 24 hours) at 2/15/2024 0950  Last data filed at 2/14/2024 2352  Gross per 24 hour   Intake 180 ml   Output 900 ml   Net -720 ml         Physical Exam  Constitutional:       Appearance: Normal appearance. He is normal weight.   HENT:      Head: Normocephalic and atraumatic.      Nose: Nose normal.      Mouth/Throat:      Mouth: Mucous membranes are moist.      Pharynx: Oropharynx is clear.   Eyes:      Extraocular Movements: Extraocular movements intact.      Conjunctiva/sclera: Conjunctivae normal.      Pupils: Pupils are equal, round, and reactive to light.   Cardiovascular:      Rate and Rhythm: Normal rate and regular rhythm.      Pulses: Normal pulses.      Heart sounds: Normal heart sounds.   Pulmonary:      Effort: Pulmonary effort is normal.      Breath sounds: Normal breath sounds.   Abdominal:      General: Bowel sounds are normal.      Palpations: Abdomen is soft.   Musculoskeletal:         General: Normal range of motion.      Cervical back: Normal range of motion and neck supple.      Right lower leg: Edema present.      Left lower leg: Edema present.   Skin:     General: Skin is warm and dry.      Capillary Refill: Capillary refill takes 2 to 3 seconds.   Neurological:      General: No focal deficit present.      Mental Status: He is alert. Mental status is at baseline.   Psychiatric:         Attention and Perception: Attention normal.         Speech:  "Speech is rapid and pressured.         Behavior: Behavior is cooperative.         Cognition and Memory: Cognition is impaired. Memory is impaired.             Significant Labs: All pertinent labs within the past 24 hours have been reviewed.  BMP: No results for input(s): "GLU", "NA", "K", "CL", "CO2", "BUN", "CREATININE", "CALCIUM", "MG" in the last 48 hours.  CBC: No results for input(s): "WBC", "HGB", "HCT", "PLT" in the last 48 hours.  CMP: No results for input(s): "NA", "K", "CL", "CO2", "GLU", "BUN", "CREATININE", "CALCIUM", "PROT", "ALBUMIN", "BILITOT", "ALKPHOS", "AST", "ALT", "ANIONGAP", "EGFRNONAA" in the last 48 hours.    Invalid input(s): "ESTGFA[place]"  Magnesium: No results for input(s): "MG" in the last 48 hours.    Significant Imaging: I have reviewed all pertinent imaging results/findings within the past 24 hours.  "

## 2024-02-15 NOTE — PLAN OF CARE
Problem: Adult Inpatient Plan of Care  Goal: Plan of Care Review  Outcome: Ongoing, Progressing  Goal: Patient-Specific Goal (Individualized)  Outcome: Ongoing, Progressing  Goal: Absence of Hospital-Acquired Illness or Injury  Outcome: Ongoing, Progressing  Goal: Optimal Comfort and Wellbeing  Outcome: Ongoing, Progressing  Goal: Readiness for Transition of Care  Outcome: Ongoing, Progressing     Problem: Seizure, Active Management  Goal: Absence of Seizure/Seizure-Related Injury  Outcome: Ongoing, Progressing     Problem: Pain Acute  Goal: Acceptable Pain Control and Functional Ability  Outcome: Ongoing, Progressing     Problem: Fall Injury Risk  Goal: Absence of Fall and Fall-Related Injury  Outcome: Ongoing, Progressing     Problem: Skin Injury Risk Increased  Goal: Skin Health and Integrity  Outcome: Ongoing, Progressing     Problem: Cognitive Impairment (Functional Deficit)  Goal: Optimal Functional [place]  Outcome: Ongoing, Progressing     Problem: Mobility Impairment  Goal: Optimal Mobility  Outcome: Ongoing, Progressing     Problem: Communication Impairment  Goal: Effective Communication Skills  Outcome: Ongoing, Progressing

## 2024-02-15 NOTE — PROGRESS NOTES
[facility]  [facility]  Progress Note    Patient Name: Sincere Anderson  MRN: [id number]  Patient Class: IP- Inpatient   Admission Date: 2/11/2024  Length of Stay: 4 days  Attending Physician: Milton Vega MD  Primary Care Provider: Meeks, Claude H., MD        Subjective:     Principal Problem:Physical deconditioning        HPI:  64 yo male presents to the ED with weakness.  He fell last week and has been more weak and c/o back pain since.  As per the caregiver he is usually able to get up to assist with ADL's but now he can not.  His main caregiver was his mother but she passed away which then fell to his siter and she passed away last year.  The sister who is caring for him now is not able to care or him now since he requires more heavy lifting since his fall.  Imaging of back with X-ray and CT did not show any acute findings.  Labs were obtained which were stable.  No N/V/D/C.  No fever/chills.  No chest pain/SOB.  He does have some bruising from his falls. Due to inability to be cared for the family brought him in for possible placement.  Will get PT/OT involved as well to address his weakness.  It appears he can more all extremities.  He does have mental disability and cannot choice for himself.  He will be admitted for failure to thrive, therapy and possible placement to NH.    Overview/Hospital Course:  2/12/24-Patient is resting at this time.  His night dose of Dilantin was not given because it fell off list so an extra dose was given this morning.  He is have some mild seizure like activity.  Will closely monitor.    2/13/24-No issues today.  We are waiting for placement to NH.  CM should be back tomorrow.    2/14/24-Patient is doing good.  Still waiting for NH placement.    2/15/24-No new issues today.  The patient is stable.  Attempting to get into NH.  The family can no longer meet his needs at home therefore I believe he needs NH placement.      Interval  History:     Review of Systems   Unable to perform ROS: Psychiatric disorder     Objective:     Vital Signs (Most Recent):  Temp: 98 °F (36.7 °C) (02/15/24 0800)  Pulse: 82 (02/15/24 0800)  Resp: 20 (02/15/24 0800)  BP: 113/70 (02/15/24 0800)  SpO2: 96 % (02/15/24 0800) Vital Signs (24h Range):  Temp:  [97.4 °F (36.3 °C)-98.4 °F (36.9 °C)] 98 °F (36.7 °C)  Pulse:  [77-84] 82  Resp:  [16-20] 20  SpO2:  [96 %-99 %] 96 %  BP: (103-138)/(65-78) 113/70     Weight: 89.9 kg (198 lb 3.1 oz)  Body mass index is 26.88 kg/m².    Intake/Output Summary (Last 24 hours) at 2/15/2024 0950  Last data filed at 2/14/2024 2352  Gross per 24 hour   Intake 180 ml   Output 900 ml   Net -720 ml         Physical Exam  Constitutional:       Appearance: Normal appearance. He is normal weight.   HENT:      Head: Normocephalic and atraumatic.      Nose: Nose normal.      Mouth/Throat:      Mouth: Mucous membranes are moist.      Pharynx: Oropharynx is clear.   Eyes:      Extraocular Movements: Extraocular movements intact.      Conjunctiva/sclera: Conjunctivae normal.      Pupils: Pupils are equal, round, and reactive to light.   Cardiovascular:      Rate and Rhythm: Normal rate and regular rhythm.      Pulses: Normal pulses.      Heart sounds: Normal heart sounds.   Pulmonary:      Effort: Pulmonary effort is normal.      Breath sounds: Normal breath sounds.   Abdominal:      General: Bowel sounds are normal.      Palpations: Abdomen is soft.   Musculoskeletal:         General: Normal range of motion.      Cervical back: Normal range of motion and neck supple.      Right lower leg: Edema present.      Left lower leg: Edema present.   Skin:     General: Skin is warm and dry.      Capillary Refill: Capillary refill takes 2 to 3 seconds.   Neurological:      General: No focal deficit present.      Mental Status: He is alert. Mental status is at baseline.   Psychiatric:         Attention and Perception: Attention normal.         Speech: Speech is  "rapid and pressured.         Behavior: Behavior is cooperative.         Cognition and Memory: Cognition is impaired. Memory is impaired.             Significant Labs: All pertinent labs within the past 24 hours have been reviewed.  BMP: No results for input(s): "GLU", "NA", "K", "CL", "CO2", "BUN", "CREATININE", "CALCIUM", "MG" in the last 48 hours.  CBC: No results for input(s): "WBC", "HGB", "HCT", "PLT" in the last 48 hours.  CMP: No results for input(s): "NA", "K", "CL", "CO2", "GLU", "BUN", "CREATININE", "CALCIUM", "PROT", "ALBUMIN", "BILITOT", "ALKPHOS", "AST", "ALT", "ANIONGAP", "EGFRNONAA" in the last 48 hours.    Invalid input(s): "ESTGF[place]"  Magnesium: No results for input(s): "MG" in the last 48 hours.    Significant Imaging: I have reviewed all pertinent imaging results/findings within the past 24 hours.    Assessment/Plan:      * Physical deconditioning  Admit to inpatient  Consult PT/OT  Follow labs  Resume home meds  CM for placement      Mental developmental delay  Review and resume home meds      Failure to thrive in adult  Therapy  NH placement      Seizure disorder  Resume home meds  Closely monitor        VTE Risk Mitigation (From admission, onward)           Ordered     IP VTE LOW RISK PATIENT  Once         02/11/24 1334     Place RACHEL hose  Until discontinued         02/11/24 1334     Place sequential compression device  Until discontinued         02/11/24 1334                  Waiting for NH placement.  Family can no longer care for him at home due to siblings getting older and due to their own health reasons can't care for him.    Therapy  OOB  Resume current meds  Discharge Planning   IRAM:      Code Status: Full Code   Is the patient medically ready for discharge?:     Reason for patient still in hospital (select all that apply): Treatment, PT / OT recommendations, and Pending disposition  Discharge Plan A: Skilled Nursing Facility                  Milton Vega MD  Department of " [facility]   [facility]

## 2024-02-15 NOTE — PLAN OF CARE
Problem: Adult Inpatient Plan of Care  Goal: Plan of Care Review  Outcome: Ongoing, Progressing  Goal: Patient-Specific Goal (Individualized)  Outcome: Ongoing, Progressing  Goal: Absence of Hospital-Acquired Illness or Injury  Outcome: Ongoing, Progressing  Goal: Optimal Comfort and Wellbeing  Outcome: Ongoing, Progressing  Goal: Readiness for Transition of Care  Outcome: Ongoing, Progressing     Problem: Seizure, Active Management  Goal: Absence of Seizure/Seizure-Related Injury  Outcome: Ongoing, Progressing     Problem: Pain Acute  Goal: Acceptable Pain Control and Functional Ability  Outcome: Ongoing, Progressing     Problem: Fall Injury Risk  Goal: Absence of Fall and Fall-Related Injury  Outcome: Ongoing, Progressing     Problem: Skin Injury Risk Increased  Goal: Skin Health and Integrity  Outcome: Ongoing, Progressing     Problem: Cognitive Impairment (Functional Deficit)  Goal: Optimal Functional [place]  Outcome: Ongoing, Progressing     Problem: Mobility Impairment  Goal: Optimal Mobility  Outcome: Ongoing, Progressing     Problem: Communication Impairment  Goal: Effective Communication Skills  Outcome: Ongoing, Progressing

## 2024-02-15 NOTE — PT/OT/SLP PROGRESS
Occupational Therapy  Treatment    Name: Sincere Anderson    : 1960 (63 y.o.)  MRN: [id number]          TREATMENT SUMMARY AND RECOMMENDATIONS:      Subjective Assessment:   No complaints  Lethargic    Awake, alert, cooperative  Uncooperative    Agitated  Flat affect    Appropriate  c/o fatigue   x Confused x Treated at bedside     Emotionally liable  Treated in gym/dept.   x Impulsive x Other: Noted pt not perseverating on left knee and lower back pain today.       Pain/Comfort:  Pain Rating 1:  (Note pt with less complaints of discomfort or grimacing with transfers.)      Therapy Precautions:   x Cognitive deficits  Spinal precautions    Collar - hard  Sternal precautions    Collar - soft   TLSO   x Fall risk  LSO    Hip precautions - posterior  Knee immobilizer    Hip precautions - anterior  WBAT   x Impaired communication  Partial weightbearing    Oxygen  TTWB    PEG tube  NWB    Visual deficits x Other: Mentally disabled    Hearing deficits           Vitals Value   x Room air      Oxygen (L)     Blood pressure     Heart rate         Treatment Objectives:     Mobility Training:    Mobility task Assist level Comments    Bed mobility Min Bed mobility rolling l/r 2x's min tactile/verbal cues using bed rail.  Transferred supine to sit min assist with upper trunk with HOB elevated.   Balance training     Transfer Min Transfer training bed to wc min assist stand pivot.  Noted pt with increased trunk extension and less guarding LLE during weight bearing.    Functional mobility     Sit to stand transitions     Other:       ADL Training:    ADL Assist level Comments    Feeding     Grooming/hygiene Mod ADL oral care, after setup with toothette pt able to clean 50% of mouth with verbal cuing, required assist for remaining.   Bathing Dep Assisted niece with sponge bath total assist.   Upper body dressing Dep assist Dep assist to don pull up, sweat pants, slipper socks.   Lower body dressing Max Don pull over long sleeve  shirt.   Toileting     Toilet transfer     Adaptive equipment training     Other:         Additional Treatment:  Reinforced call function and call before you fall.    Assessment: Patient tolerated session fair.  Noted decrease in pain perseveration and was able to transfer to  with min assist.  Pt continues to require 24 hour care.    OT Plan: Cont POC      GOALS:   Multidisciplinary Problems       Occupational Therapy Goals          Problem: Occupational Therapy    Goal Priority Disciplines Outcome Interventions   Occupational Therapy Goal     OT, PT/OT Ongoing, Progressing    Description:   Patient will increase functional independence with ADLs by performing:    Feeding with Set-up Assistance.  Grooming while seated with Contact Guard Assistance.  Toilet transfer to bedside commode with Contact Guard Assistance.                         Communication with Treatment Team:     Discharge Recommendations:    Discharge Equipment Recommendations:   (TBD)  Barriers to discharge:  Decreased caregiver support      At end of treatment, patient remained:  x Up in chair     In bed   x With alarm activated    Bed rails up   x Call bell in reach    x Family/friends present    Restraints secured properly    In bathroom with CNA/RN notified    In gym with PT/PTA/tech   x Nurse aware    Other:         OT Date of Treatment: 02/15/24  OT Start Time: 0855  OT Stop Time: 0920  OT Total Time (min): 25 min    Billable Minutes:Self Care/Home Management 15  Therapeutic Activity 10      2/15/2024

## 2024-02-15 NOTE — PLAN OF CARE
Received call from Catherine with [facility] regarding the 142. States that his disability determination has been  since  so unfortunately his 142 will not be complete until next week. Informed Celia, present at bedside of the above.    [facility] plan to assess patient today.

## 2024-02-16 PROCEDURE — 94761 N-INVAS EAR/PLS OXIMETRY MLT: CPT

## 2024-02-16 PROCEDURE — 25000003 PHARM REV CODE 250: Performed by: INTERNAL MEDICINE

## 2024-02-16 PROCEDURE — 11000001 HC ACUTE MED/SURG PRIVATE ROOM

## 2024-02-16 PROCEDURE — 25000003 PHARM REV CODE 250: Performed by: GENERAL PRACTICE

## 2024-02-16 RX ADMIN — SENNOSIDES AND DOCUSATE SODIUM 1 TABLET: 50; 8.6 TABLET ORAL at 08:02

## 2024-02-16 RX ADMIN — PHENOBARBITAL 129.6 MG: 32.4 TABLET ORAL at 08:02

## 2024-02-16 RX ADMIN — DICLOFENAC SODIUM TOPICAL GEL, 1%, 2 G: 10 GEL TOPICAL at 08:02

## 2024-02-16 RX ADMIN — LEVOTHYROXINE SODIUM 50 MCG: 0.05 TABLET ORAL at 05:02

## 2024-02-16 RX ADMIN — RISPERIDONE 1 MG: 1 TABLET ORAL at 08:02

## 2024-02-16 NOTE — PLAN OF CARE
[facility] denied patient. Family requests referral be sent to [facility]. Referral sent via [facility] and follow text sent to admissions.

## 2024-02-16 NOTE — SUBJECTIVE & OBJECTIVE
Interval History:     Review of Systems   Unable to perform ROS: Psychiatric disorder     Objective:     Vital Signs (Most Recent):  Temp: 99.8 °F (37.7 °C) (02/16/24 0700)  Pulse: 110 (02/16/24 0700)  Resp: 18 (02/16/24 0700)  BP: (!) 87/58 (02/16/24 0700)  SpO2: 97 % (02/16/24 0700) Vital Signs (24h Range):  Temp:  [98.2 °F (36.8 °C)-99.8 °F (37.7 °C)] 99.8 °F (37.7 °C)  Pulse:  [] 110  Resp:  [18-20] 18  SpO2:  [97 %-100 %] 97 %  BP: ()/(58-78) 87/58     Weight: 89.9 kg (198 lb 3.1 oz)  Body mass index is 26.88 kg/m².    Intake/Output Summary (Last 24 hours) at 2/16/2024 1145  Last data filed at 2/16/2024 0729  Gross per 24 hour   Intake 956 ml   Output 2250 ml   Net -1294 ml         Physical Exam  Constitutional:       General: He is awake.      Appearance: Normal appearance. He is obese.   HENT:      Head: Normocephalic and atraumatic.      Nose: Nose normal.      Mouth/Throat:      Mouth: Mucous membranes are moist.      Pharynx: Oropharynx is clear.   Eyes:      Extraocular Movements: Extraocular movements intact.      Conjunctiva/sclera: Conjunctivae normal.      Pupils: Pupils are equal, round, and reactive to light.   Cardiovascular:      Rate and Rhythm: Normal rate and regular rhythm.      Pulses: Normal pulses.      Heart sounds: Normal heart sounds.   Pulmonary:      Effort: Pulmonary effort is normal.      Breath sounds: Normal breath sounds.   Abdominal:      General: Bowel sounds are normal.      Palpations: Abdomen is soft.   Musculoskeletal:         General: Normal range of motion.      Cervical back: Normal range of motion and neck supple.   Skin:     General: Skin is warm and dry.      Capillary Refill: Capillary refill takes 2 to 3 seconds.   Neurological:      General: No focal deficit present.      Mental Status: Mental status is at baseline.   Psychiatric:         Mood and Affect: Mood is elated.         Speech: Speech is delayed and slurred.         Behavior: Behavior is  "cooperative.         Cognition and Memory: Cognition is impaired. Memory is impaired.             Significant Labs: All pertinent labs within the past 24 hours have been reviewed.  BMP: No results for input(s): "GLU", "NA", "K", "CL", "CO2", "BUN", "CREATININE", "CALCIUM", "MG" in the last 48 hours.  CBC: No results for input(s): "WBC", "HGB", "HCT", "PLT" in the last 48 hours.  CMP: No results for input(s): "NA", "K", "CL", "CO2", "GLU", "BUN", "CREATININE", "CALCIUM", "PROT", "ALBUMIN", "BILITOT", "ALKPHOS", "AST", "ALT", "ANIONGAP", "EGFRNONAA" in the last 48 hours.    Invalid input(s): "ESTGF[place]"  Magnesium: No results for input(s): "MG" in the last 48 hours.    Significant Imaging: I have reviewed all pertinent imaging results/findings within the past 24 hours.  "

## 2024-02-16 NOTE — PROGRESS NOTES
02/16/24 1248   Discharge Reassessment   Assessment Type Discharge Planning Reassessment   Did the patient's condition or plan change since previous assessment? No   Discharge Plan discussed with: Patient;Parent(s)   Discharge Plan A Skilled Nursing Facility   DME Needed Upon Discharge  none   Why the patient remains in the hospital Requires continued medical care   Post-Acute Status   Post-Acute Authorization Placement   Post-Acute Placement Status Pending payor review/awaiting authorization (if required)   Coverage Medicare   Discharge Delays (!) Post-Acute Set-up

## 2024-02-16 NOTE — PLAN OF CARE
Problem: Adult Inpatient Plan of Care  Goal: Plan of Care Review  Outcome: Ongoing, Progressing  Goal: Patient-Specific Goal (Individualized)  Outcome: Ongoing, Progressing  Goal: Absence of Hospital-Acquired Illness or Injury  Outcome: Ongoing, Progressing  Goal: Optimal Comfort and Wellbeing  Outcome: Ongoing, Progressing  Goal: Readiness for Transition of Care  Outcome: Ongoing, Progressing     Problem: Seizure, Active Management  Goal: Absence of Seizure/Seizure-Related Injury  Outcome: Ongoing, Progressing     Problem: Pain Acute  Goal: Acceptable Pain Control and Functional Ability  Outcome: Ongoing, Progressing     Problem: Fall Injury Risk  Goal: Absence of Fall and Fall-Related Injury  Outcome: Ongoing, Progressing     Problem: Skin Injury Risk Increased  Goal: Skin Health and Integrity  Outcome: Ongoing, Progressing     Problem: Cognitive Impairment (Functional Deficit)  Goal: Optimal Functional [place]  Outcome: Ongoing, Progressing     Problem: Mobility Impairment  Goal: Optimal Mobility  Outcome: Ongoing, Progressing     Problem: Communication Impairment  Goal: Effective Communication Skills  Outcome: Ongoing, Progressing

## 2024-02-16 NOTE — PROGRESS NOTES
[facility]  [facility] Medicine  Progress Note    Patient Name: Sincere Anderson  MRN: [id number]  Patient Class: IP- Inpatient   Admission Date: 2/11/2024  Length of Stay: 5 days  Attending Physician: Milton Vega MD  Primary Care Provider: Meeks, Claude H., MD        Subjective:     Principal Problem:Physical deconditioning        HPI:  62 yo male presents to the ED with weakness.  He fell last week and has been more weak and c/o back pain since.  As per the caregiver he is usually able to get up to assist with ADL's but now he can not.  His main caregiver was his mother but she passed away which then fell to his siter and she passed away last year.  The sister who is caring for him now is not able to care or him now since he requires more heavy lifting since his fall.  Imaging of back with X-ray and CT did not show any acute findings.  Labs were obtained which were stable.  No N/V/D/C.  No fever/chills.  No chest pain/SOB.  He does have some bruising from his falls. Due to inability to be cared for the family brought him in for possible placement.  Will get PT/OT involved as well to address his weakness.  It appears he can more all extremities.  He does have mental disability and cannot choice for himself.  He will be admitted for failure to thrive, therapy and possible placement to NH.    Overview/Hospital Course:  2/12/24-Patient is resting at this time.  His night dose of Dilantin was not given because it fell off list so an extra dose was given this morning.  He is have some mild seizure like activity.  Will closely monitor.    2/13/24-No issues today.  We are waiting for placement to NH.  CM should be back tomorrow.    2/14/24-Patient is doing good.  Still waiting for NH placement.    2/15/24-No new issues today.  The patient is stable.  Attempting to get into NH.  The family can no longer meet his needs at home therefore I believe he needs NH placement.       2/16/24-Patient is doing good at this time.  Waiting for placement.    Interval History:     Review of Systems   Unable to perform ROS: Psychiatric disorder     Objective:     Vital Signs (Most Recent):  Temp: 99.8 °F (37.7 °C) (02/16/24 0700)  Pulse: 110 (02/16/24 0700)  Resp: 18 (02/16/24 0700)  BP: (!) 87/58 (02/16/24 0700)  SpO2: 97 % (02/16/24 0700) Vital Signs (24h Range):  Temp:  [98.2 °F (36.8 °C)-99.8 °F (37.7 °C)] 99.8 °F (37.7 °C)  Pulse:  [] 110  Resp:  [18-20] 18  SpO2:  [97 %-100 %] 97 %  BP: ()/(58-78) 87/58     Weight: 89.9 kg (198 lb 3.1 oz)  Body mass index is 26.88 kg/m².    Intake/Output Summary (Last 24 hours) at 2/16/2024 1145  Last data filed at 2/16/2024 0729  Gross per 24 hour   Intake 956 ml   Output 2250 ml   Net -1294 ml         Physical Exam  Constitutional:       General: He is awake.      Appearance: Normal appearance. He is obese.   HENT:      Head: Normocephalic and atraumatic.      Nose: Nose normal.      Mouth/Throat:      Mouth: Mucous membranes are moist.      Pharynx: Oropharynx is clear.   Eyes:      Extraocular Movements: Extraocular movements intact.      Conjunctiva/sclera: Conjunctivae normal.      Pupils: Pupils are equal, round, and reactive to light.   Cardiovascular:      Rate and Rhythm: Normal rate and regular rhythm.      Pulses: Normal pulses.      Heart sounds: Normal heart sounds.   Pulmonary:      Effort: Pulmonary effort is normal.      Breath sounds: Normal breath sounds.   Abdominal:      General: Bowel sounds are normal.      Palpations: Abdomen is soft.   Musculoskeletal:         General: Normal range of motion.      Cervical back: Normal range of motion and neck supple.   Skin:     General: Skin is warm and dry.      Capillary Refill: Capillary refill takes 2 to 3 seconds.   Neurological:      General: No focal deficit present.      Mental Status: Mental status is at baseline.   Psychiatric:         Mood and Affect: Mood is elated.          "Speech: Speech is delayed and slurred.         Behavior: Behavior is cooperative.         Cognition and Memory: Cognition is impaired. Memory is impaired.             Significant Labs: All pertinent labs within the past 24 hours have been reviewed.  BMP: No results for input(s): "GLU", "NA", "K", "CL", "CO2", "BUN", "CREATININE", "CALCIUM", "MG" in the last 48 hours.  CBC: No results for input(s): "WBC", "HGB", "HCT", "PLT" in the last 48 hours.  CMP: No results for input(s): "NA", "K", "CL", "CO2", "GLU", "BUN", "CREATININE", "CALCIUM", "PROT", "ALBUMIN", "BILITOT", "ALKPHOS", "AST", "ALT", "ANIONGAP", "EGFRNONAA" in the last 48 hours.    Invalid input(s): "ESTGFAFR[place]"  Magnesium: No results for input(s): "MG" in the last 48 hours.    Significant Imaging: I have reviewed all pertinent imaging results/findings within the past 24 hours.    Assessment/Plan:      * Physical deconditioning  Admit to inpatient  Consult PT/OT  Follow labs  Resume home meds  CM for placement      Mental developmental delay  Review and resume home meds      Failure to thrive in adult  Therapy  NH placement      Seizure disorder  Resume home meds  Closely monitor        VTE Risk Mitigation (From admission, onward)           Ordered     IP VTE LOW RISK PATIENT  Once         02/11/24 1334     Place RACHEL hose  Until discontinued         02/11/24 1334     Place sequential compression device  Until discontinued         02/11/24 1334                  Resume current treatment  Waiting for placement  OOB  therapy  Discharge Planning   IRAM:      Code Status: Full Code   Is the patient medically ready for discharge?:     Reason for patient still in hospital (select all that apply): Patient trending condition, PT / OT recommendations, and Pending disposition  Discharge Plan A: Skilled Nursing Facility                  Milton Vega MD  Department of Hospital Medicine   [facility] - Medical Surgical Unit    "

## 2024-02-16 NOTE — PLAN OF CARE
Problem: Adult Inpatient Plan of Care  Goal: Plan of Care Review  Outcome: Ongoing, Progressing  Goal: Patient-Specific Goal (Individualized)  Outcome: Ongoing, Progressing  Goal: Absence of Hospital-Acquired Illness or Injury  Outcome: Ongoing, Progressing  Goal: Optimal Comfort and Wellbeing  Outcome: Ongoing, Progressing  Goal: Readiness for Transition of Care  Outcome: Ongoing, Progressing     Problem: Seizure, Active Management  Goal: Absence of Seizure/Seizure-Related Injury  Outcome: Ongoing, Progressing     Problem: Pain Acute  Goal: Acceptable Pain Control and Functional Ability  Outcome: Ongoing, Progressing     Problem: Fall Injury Risk  Goal: Absence of Fall and Fall-Related Injury  Outcome: Ongoing, Progressing     Problem: Skin Injury Risk Increased  Goal: Skin Health and Integrity  Outcome: Ongoing, Progressing     Problem: Cognitive Impairment (Functional Deficit)  Goal: Optimal Functional [place]  Outcome: Ongoing, Progressing     Problem: Mobility Impairment  Goal: Optimal Mobility  Outcome: Ongoing, Progressing     Problem: Communication Impairment  Goal: Effective Communication Skills  Outcome: Ongoing, Progressing

## 2024-02-16 NOTE — NURSING
Pt noted to be warm to touch at this time. Temperature checked 99.8 axillary. Applied cold pack at this time. Removed pt blanket and placed only sheet.

## 2024-02-17 PROCEDURE — 25000003 PHARM REV CODE 250: Performed by: GENERAL PRACTICE

## 2024-02-17 PROCEDURE — 94761 N-INVAS EAR/PLS OXIMETRY MLT: CPT

## 2024-02-17 PROCEDURE — 94760 N-INVAS EAR/PLS OXIMETRY 1: CPT

## 2024-02-17 PROCEDURE — 25000003 PHARM REV CODE 250: Performed by: INTERNAL MEDICINE

## 2024-02-17 PROCEDURE — 11000001 HC ACUTE MED/SURG PRIVATE ROOM

## 2024-02-17 RX ADMIN — LEVOTHYROXINE SODIUM 50 MCG: 0.05 TABLET ORAL at 06:02

## 2024-02-17 RX ADMIN — SENNOSIDES AND DOCUSATE SODIUM 1 TABLET: 50; 8.6 TABLET ORAL at 08:02

## 2024-02-17 RX ADMIN — DICLOFENAC SODIUM TOPICAL GEL, 1%, 2 G: 10 GEL TOPICAL at 08:02

## 2024-02-17 RX ADMIN — DICLOFENAC SODIUM TOPICAL GEL, 1%, 2 G: 10 GEL TOPICAL at 09:02

## 2024-02-17 RX ADMIN — RISPERIDONE 1 MG: 1 TABLET ORAL at 08:02

## 2024-02-17 RX ADMIN — PHENOBARBITAL 129.6 MG: 32.4 TABLET ORAL at 08:02

## 2024-02-17 NOTE — PROGRESS NOTES
[facility]  [facility] Medicine  Progress Note    Patient Name: Sincere Anderson  MRN: [id number]  Patient Class: IP- Inpatient   Admission Date: 2/11/2024  Length of Stay: 6 days  Attending Physician: Milton Vega MD  Primary Care Provider: Meeks, Claude H., MD        Subjective:     Principal Problem:Physical deconditioning        HPI:  64 yo male presents to the ED with weakness.  He fell last week and has been more weak and c/o back pain since.  As per the caregiver he is usually able to get up to assist with ADL's but now he can not.  His main caregiver was his mother but she passed away which then fell to his siter and she passed away last year.  The sister who is caring for him now is not able to care or him now since he requires more heavy lifting since his fall.  Imaging of back with X-ray and CT did not show any acute findings.  Labs were obtained which were stable.  No N/V/D/C.  No fever/chills.  No chest pain/SOB.  He does have some bruising from his falls. Due to inability to be cared for the family brought him in for possible placement.  Will get PT/OT involved as well to address his weakness.  It appears he can more all extremities.  He does have mental disability and cannot choice for himself.  He will be admitted for failure to thrive, therapy and possible placement to NH.    Overview/Hospital Course:  2/12/24-Patient is resting at this time.  His night dose of Dilantin was not given because it fell off list so an extra dose was given this morning.  He is have some mild seizure like activity.  Will closely monitor.    2/13/24-No issues today.  We are waiting for placement to NH.  CM should be back tomorrow.    2/14/24-Patient is doing good.  Still waiting for NH placement.    2/15/24-No new issues today.  The patient is stable.  Attempting to get into NH.  The family can no longer meet his needs at home therefore I believe he needs NH placement.       2/16/24-Patient is doing good at this time.  Waiting for placement.    2/17/24-NO new issues today.  Family state he slept good last night .  Will continue waiting for placement.      Interval History:     Review of Systems   Unable to perform ROS: Psychiatric disorder     Objective:     Vital Signs (Most Recent):  Temp: 98.8 °F (37.1 °C) (02/17/24 0742)  Pulse: 81 (02/17/24 0742)  Resp: 18 (02/17/24 0742)  BP: 100/64 (02/17/24 0742)  SpO2: (!) 93 % (02/17/24 0742) Vital Signs (24h Range):  Temp:  [97.6 °F (36.4 °C)-99.3 °F (37.4 °C)] 98.8 °F (37.1 °C)  Pulse:  [] 81  Resp:  [16-18] 18  SpO2:  [93 %-99 %] 93 %  BP: ()/(61-70) 100/64     Weight: 89.9 kg (198 lb 3.1 oz)  Body mass index is 26.88 kg/m².    Intake/Output Summary (Last 24 hours) at 2/17/2024 1055  Last data filed at 2/17/2024 0800  Gross per 24 hour   Intake 240 ml   Output 475 ml   Net -235 ml         Physical Exam  HENT:      Head: Normocephalic and atraumatic.      Nose: Nose normal.      Mouth/Throat:      Mouth: Mucous membranes are moist.      Pharynx: Oropharynx is clear.   Eyes:      Extraocular Movements: Extraocular movements intact.      Conjunctiva/sclera: Conjunctivae normal.      Pupils: Pupils are equal, round, and reactive to light.   Cardiovascular:      Rate and Rhythm: Normal rate and regular rhythm.      Pulses: Normal pulses.      Heart sounds: Normal heart sounds.   Pulmonary:      Effort: Pulmonary effort is normal.      Breath sounds: Normal breath sounds.   Abdominal:      General: Bowel sounds are normal.      Palpations: Abdomen is soft.   Musculoskeletal:         General: Normal range of motion.      Cervical back: Normal range of motion and neck supple.   Skin:     General: Skin is warm and dry.      Capillary Refill: Capillary refill takes 2 to 3 seconds.   Neurological:      General: No focal deficit present.      Mental Status: He is alert. Mental status is at baseline.   Psychiatric:         Mood and  "Affect: Mood is elated.         Speech: Speech is delayed.         Behavior: Behavior is cooperative.         Cognition and Memory: Cognition is impaired. Memory is impaired.             Significant Labs: All pertinent labs within the past 24 hours have been reviewed.  BMP: No results for input(s): "GLU", "NA", "K", "CL", "CO2", "BUN", "CREATININE", "CALCIUM", "MG" in the last 48 hours.  CBC: No results for input(s): "WBC", "HGB", "HCT", "PLT" in the last 48 hours.  CMP: No results for input(s): "NA", "K", "CL", "CO2", "GLU", "BUN", "CREATININE", "CALCIUM", "PROT", "ALBUMIN", "BILITOT", "ALKPHOS", "AST", "ALT", "ANIONGAP", "EGFRNONAA" in the last 48 hours.    Invalid input(s): "ESTGFAFR[place]"  Magnesium: No results for input(s): "MG" in the last 48 hours.    Significant Imaging: I have reviewed all pertinent imaging results/findings within the past 24 hours.    Assessment/Plan:      * Physical deconditioning  Admit to inpatient  Consult PT/OT  Follow labs  Resume home meds  CM for placement      Mental developmental delay  Review and resume home meds      Failure to thrive in adult  Therapy  NH placement      Seizure disorder  Resume home meds  Closely monitor        VTE Risk Mitigation (From admission, onward)           Ordered     IP VTE LOW RISK PATIENT  Once         02/11/24 1334     Place RACHEL hose  Until discontinued         02/11/24 1334     Place sequential compression device  Until discontinued         02/11/24 1334                Continue current meds  OOB  Therapy  Waiting for placement.     Discharge Planning   IRAM:      Code Status: Full Code   Is the patient medically ready for discharge?:     Reason for patient still in hospital (select all that apply): Patient trending condition, Treatment, PT / OT recommendations, and Pending disposition  Discharge Plan A: Skilled Nursing Facility   Discharge Delays: (!) Post-Acute Set-up              Milton Vega MD  Department of Hospital Medicine   [facility]

## 2024-02-17 NOTE — PLAN OF CARE
Problem: Adult Inpatient Plan of Care  Goal: Plan of Care Review  Outcome: Ongoing, Progressing  Goal: Patient-Specific Goal (Individualized)  Outcome: Ongoing, Progressing  Goal: Absence of Hospital-Acquired Illness or Injury  Outcome: Ongoing, Progressing  Goal: Optimal Comfort and Wellbeing  Outcome: Ongoing, Progressing  Goal: Readiness for Transition of Care  Outcome: Ongoing, Progressing     Problem: Seizure, Active Management  Goal: Absence of Seizure/Seizure-Related Injury  Outcome: Ongoing, Progressing     Problem: Pain Acute  Goal: Acceptable Pain Control and Functional Ability  Outcome: Ongoing, Progressing     Problem: Fall Injury Risk  Goal: Absence of Fall and Fall-Related Injury  Outcome: Ongoing, Progressing     Problem: Skin Injury Risk Increased  Goal: Skin Health and Integrity  Outcome: Ongoing, Progressing     Problem: Cognitive Impairment (Functional Deficit)  Goal: Optimal Functional [place]  Outcome: Ongoing, Progressing     Problem: Mobility Impairment  Goal: Optimal Mobility  Outcome: Ongoing, Progressing     Problem: Communication Impairment  Goal: Effective Communication Skills  Outcome: Ongoing, Progressing

## 2024-02-17 NOTE — PLAN OF CARE
Problem: Adult Inpatient Plan of Care  Goal: Plan of Care Review  Outcome: Ongoing, Progressing  Goal: Patient-Specific Goal (Individualized)  Outcome: Ongoing, Progressing  Goal: Absence of Hospital-Acquired Illness or Injury  Outcome: Ongoing, Progressing  Goal: Optimal Comfort and Wellbeing  Outcome: Ongoing, Progressing  Goal: Readiness for Transition of Care  Outcome: Ongoing, Progressing     Problem: Seizure, Active Management  Goal: Absence of Seizure/Seizure-Related Injury  Outcome: Ongoing, Progressing     Problem: Pain Acute  Goal: Acceptable Pain Control and Functional Ability  Outcome: Ongoing, Progressing     Problem: Fall Injury Risk  Goal: Absence of Fall and Fall-Related Injury  Outcome: Ongoing, Progressing     Problem: Skin Injury Risk Increased  Goal: Skin Health and Integrity  Outcome: Ongoing, Progressing     Problem: Cognitive Impairment (Functional Deficit)  Goal: Optimal Functional [place]  Outcome: Ongoing, Progressing     Problem: Mobility Impairment  Goal: Optimal Mobility  Outcome: Ongoing, Progressing     Problem: Communication Impairment  Goal: Effective Communication Skills  Outcome: Ongoing, Progressing

## 2024-02-17 NOTE — SUBJECTIVE & OBJECTIVE
Interval History:     Review of Systems   Unable to perform ROS: Psychiatric disorder     Objective:     Vital Signs (Most Recent):  Temp: 98.8 °F (37.1 °C) (02/17/24 0742)  Pulse: 81 (02/17/24 0742)  Resp: 18 (02/17/24 0742)  BP: 100/64 (02/17/24 0742)  SpO2: (!) 93 % (02/17/24 0742) Vital Signs (24h Range):  Temp:  [97.6 °F (36.4 °C)-99.3 °F (37.4 °C)] 98.8 °F (37.1 °C)  Pulse:  [] 81  Resp:  [16-18] 18  SpO2:  [93 %-99 %] 93 %  BP: ()/(61-70) 100/64     Weight: 89.9 kg (198 lb 3.1 oz)  Body mass index is 26.88 kg/m².    Intake/Output Summary (Last 24 hours) at 2/17/2024 1055  Last data filed at 2/17/2024 0800  Gross per 24 hour   Intake 240 ml   Output 475 ml   Net -235 ml         Physical Exam  HENT:      Head: Normocephalic and atraumatic.      Nose: Nose normal.      Mouth/Throat:      Mouth: Mucous membranes are moist.      Pharynx: Oropharynx is clear.   Eyes:      Extraocular Movements: Extraocular movements intact.      Conjunctiva/sclera: Conjunctivae normal.      Pupils: Pupils are equal, round, and reactive to light.   Cardiovascular:      Rate and Rhythm: Normal rate and regular rhythm.      Pulses: Normal pulses.      Heart sounds: Normal heart sounds.   Pulmonary:      Effort: Pulmonary effort is normal.      Breath sounds: Normal breath sounds.   Abdominal:      General: Bowel sounds are normal.      Palpations: Abdomen is soft.   Musculoskeletal:         General: Normal range of motion.      Cervical back: Normal range of motion and neck supple.   Skin:     General: Skin is warm and dry.      Capillary Refill: Capillary refill takes 2 to 3 seconds.   Neurological:      General: No focal deficit present.      Mental Status: He is alert. Mental status is at baseline.   Psychiatric:         Mood and Affect: Mood is elated.         Speech: Speech is delayed.         Behavior: Behavior is cooperative.         Cognition and Memory: Cognition is impaired. Memory is impaired.          "    Significant Labs: All pertinent labs within the past 24 hours have been reviewed.  BMP: No results for input(s): "GLU", "NA", "K", "CL", "CO2", "BUN", "CREATININE", "CALCIUM", "MG" in the last 48 hours.  CBC: No results for input(s): "WBC", "HGB", "HCT", "PLT" in the last 48 hours.  CMP: No results for input(s): "NA", "K", "CL", "CO2", "GLU", "BUN", "CREATININE", "CALCIUM", "PROT", "ALBUMIN", "BILITOT", "ALKPHOS", "AST", "ALT", "ANIONGAP", "EGFRNONAA" in the last 48 hours.    Invalid input(s): "ESTGFA[place]"  Magnesium: No results for input(s): "MG" in the last 48 hours.    Significant Imaging: I have reviewed all pertinent imaging results/findings within the past 24 hours.  "

## 2024-02-18 PROCEDURE — 25000003 PHARM REV CODE 250: Performed by: INTERNAL MEDICINE

## 2024-02-18 PROCEDURE — 94761 N-INVAS EAR/PLS OXIMETRY MLT: CPT

## 2024-02-18 PROCEDURE — 11000001 HC ACUTE MED/SURG PRIVATE ROOM

## 2024-02-18 PROCEDURE — 25000003 PHARM REV CODE 250: Performed by: GENERAL PRACTICE

## 2024-02-18 RX ADMIN — SENNOSIDES AND DOCUSATE SODIUM 1 TABLET: 50; 8.6 TABLET ORAL at 08:02

## 2024-02-18 RX ADMIN — RISPERIDONE 1 MG: 1 TABLET ORAL at 08:02

## 2024-02-18 RX ADMIN — SENNOSIDES AND DOCUSATE SODIUM 1 TABLET: 50; 8.6 TABLET ORAL at 09:02

## 2024-02-18 RX ADMIN — RISPERIDONE 1 MG: 1 TABLET ORAL at 09:02

## 2024-02-18 RX ADMIN — DICLOFENAC SODIUM TOPICAL GEL, 1%, 2 G: 10 GEL TOPICAL at 08:02

## 2024-02-18 RX ADMIN — LEVOTHYROXINE SODIUM 50 MCG: 0.05 TABLET ORAL at 06:02

## 2024-02-18 RX ADMIN — DICLOFENAC SODIUM TOPICAL GEL, 1%, 2 G: 10 GEL TOPICAL at 09:02

## 2024-02-18 RX ADMIN — ACETAMINOPHEN 650 MG: 325 TABLET, FILM COATED ORAL at 05:02

## 2024-02-18 RX ADMIN — PHENOBARBITAL 129.6 MG: 32.4 TABLET ORAL at 08:02

## 2024-02-18 NOTE — PLAN OF CARE
Problem: Adult Inpatient Plan of Care  Goal: Plan of Care Review  Outcome: Ongoing, Progressing  Goal: Patient-Specific Goal (Individualized)  Outcome: Ongoing, Progressing  Goal: Absence of Hospital-Acquired Illness or Injury  Outcome: Ongoing, Progressing  Goal: Optimal Comfort and Wellbeing  Outcome: Ongoing, Progressing  Goal: Readiness for Transition of Care  Outcome: Ongoing, Progressing     Problem: Seizure, Active Management  Goal: Absence of Seizure/Seizure-Related Injury  Outcome: Ongoing, Progressing     Problem: Pain Acute  Goal: Acceptable Pain Control and Functional Ability  Outcome: Ongoing, Progressing     Problem: Fall Injury Risk  Goal: Absence of Fall and Fall-Related Injury  Outcome: Ongoing, Progressing     Problem: Skin Injury Risk Increased  Goal: Skin Health and Integrity  Outcome: Ongoing, Progressing     Problem: Cognitive Impairment (Functional Deficit)  Goal: Optimal Functional [place]  Outcome: Ongoing, Progressing     Problem: Mobility Impairment  Goal: Optimal Mobility  Outcome: Ongoing, Progressing     Problem: Communication Impairment  Goal: Effective Communication Skills  Outcome: Ongoing, Progressing

## 2024-02-18 NOTE — PLAN OF CARE
Problem: Adult Inpatient Plan of Care  Goal: Plan of Care Review  Outcome: Ongoing, Progressing  Goal: Patient-Specific Goal (Individualized)  Outcome: Ongoing, Progressing  Goal: Absence of Hospital-Acquired Illness or Injury  Outcome: Ongoing, Progressing  Goal: Optimal Comfort and Wellbeing  Outcome: Ongoing, Progressing  Goal: Readiness for Transition of Care  Outcome: Ongoing, Progressing     Problem: Seizure, Active Management  Goal: Absence of Seizure/Seizure-Related Injury  Outcome: Ongoing, Progressing     Problem: Pain Acute  Goal: Acceptable Pain Control and Functional Ability  Outcome: Ongoing, Progressing     Problem: Fall Injury Risk  Goal: Absence of Fall and Fall-Related Injury  Outcome: Ongoing, Progressing     Problem: Skin Injury Risk Increased  Goal: Skin Health and Integrity  Outcome: Ongoing, Progressing     Problem: Cognitive Impairment (Functional Deficit)  Goal: Optimal Functional [place]  Outcome: Ongoing, Progressing     Problem: Mobility Impairment  Goal: Optimal Mobility  Outcome: Ongoing, Progressing     Problem: Communication Impairment  Goal: Effective Communication Skills  Outcome: Ongoing, Progressing

## 2024-02-18 NOTE — PROGRESS NOTES
[facility]  [facility]  [facility] Note    Patient Name: Sincere Anderson  MRN: [id number]  Patient Class: IP- Inpatient   Admission Date: 2/11/2024  Length of Stay: 7 days  Attending Physician: Milton Vega MD  Primary Care Provider: Meeks, Claude H., MD        Subjective:     Principal Problem:Physical deconditioning        HPI:  64 yo male presents to the ED with weakness.  He fell last week and has been more weak and c/o back pain since.  As per the caregiver he is usually able to get up to assist with ADL's but now he can not.  His main caregiver was his mother but she passed away which then fell to his siter and she passed away last year.  The sister who is caring for him now is not able to care or him now since he requires more heavy lifting since his fall.  Imaging of back with X-ray and CT did not show any acute findings.  Labs were obtained which were stable.  No N/V/D/C.  No fever/chills.  No chest pain/SOB.  He does have some bruising from his falls. Due to inability to be cared for the family brought him in for possible placement.  Will get PT/OT involved as well to address his weakness.  It appears he can more all extremities.  He does have mental disability and cannot choice for himself.  He will be admitted for failure to thrive, therapy and possible placement to NH.    Overview/Hospital Course:  2/12/24-Patient is resting at this time.  His night dose of Dilantin was not given because it fell off list so an extra dose was given this morning.  He is have some mild seizure like activity.  Will closely monitor.    2/13/24-No issues today.  We are waiting for placement to NH.  CM should be back tomorrow.    2/14/24-Patient is doing good.  Still waiting for NH placement.    2/15/24-No new issues today.  The patient is stable.  Attempting to get into NH.  The family can no longer meet his needs at home therefore I believe he needs NH placement.       2/16/24-Patient is doing good at this time.  Waiting for placement.    2/17/24-NO new issues today.  Family state he slept good last night .  Will continue waiting for placement.      2/18/24: Pt sitting up in bedside chair watching tv. He is smiling and laughing. Waiting on placement. Denies any CP, sob, pain anywhere. No acute events overnight.    Interval History:     Review of Systems   Unable to perform ROS: Other   Mental disability.   Denies cp or sob. Valdemar any discomfort at this time.    Objective:     Vital Signs (Most Recent):  Temp: 99 °F (37.2 °C) (02/18/24 0737)  Pulse: 75 (02/18/24 0737)  Resp: 18 (02/18/24 0737)  BP: 107/66 (02/18/24 0737)  SpO2: 98 % (02/18/24 0737) Vital Signs (24h Range):  Temp:  [98.1 °F (36.7 °C)-99 °F (37.2 °C)] 99 °F (37.2 °C)  Pulse:  [67-78] 75  Resp:  [18] 18  SpO2:  [94 %-99 %] 98 %  BP: (104-121)/(65-78) 107/66     Weight: 89.9 kg (198 lb 3.1 oz)  Body mass index is 26.88 kg/m².    Intake/Output Summary (Last 24 hours) at 2/18/2024 0849  Last data filed at 2/18/2024 0700  Gross per 24 hour   Intake 660 ml   Output 3450 ml   Net -2790 ml           Physical Exam  HENT:      Head: Normocephalic and atraumatic.      Nose: Nose normal.   Eyes:      Conjunctiva/sclera: Conjunctivae normal.   Cardiovascular:      Rate and Rhythm: Normal rate and regular rhythm.      Pulses: Normal pulses.      Heart sounds: Normal heart sounds.   Pulmonary:      Effort: Pulmonary effort is normal.      Breath sounds: Normal breath sounds.   Abdominal:      General: Bowel sounds are normal.      Palpations: Abdomen is soft.   Musculoskeletal:         General: Normal range of motion.      Cervical back: Normal range of motion and neck supple.   Skin:     General: Skin is warm and dry.   Neurological:      General: No focal deficit present.      Mental Status: He is alert. Mental status is at baseline.   Psychiatric:         Mood and Affect: Mood is elated.         Speech: Speech is delayed.     "     Behavior: Behavior is cooperative.         Cognition and Memory: Cognition is impaired. Memory is impaired.             Significant Labs: All pertinent labs within the past 24 hours have been reviewed.  BMP: No results for input(s): "GLU", "NA", "K", "CL", "CO2", "BUN", "CREATININE", "CALCIUM", "MG" in the last 48 hours.  CBC: No results for input(s): "WBC", "HGB", "HCT", "PLT" in the last 48 hours.  CMP: No results for input(s): "NA", "K", "CL", "CO2", "GLU", "BUN", "CREATININE", "CALCIUM", "PROT", "ALBUMIN", "BILITOT", "ALKPHOS", "AST", "ALT", "ANIONGAP", "EGFRNONAA" in the last 48 hours.    Invalid input(s): "ESTGFAFRICA"  Magnesium: No results for input(s): "MG" in the last 48 hours.    Significant Imaging: I have reviewed all pertinent imaging results/findings within the past 24 hours.    Assessment/Plan:      * Physical deconditioning  Admit to inpatient  Consult PT/OT  Follow labs  Resume home meds  CM for placement      Mental developmental delay  Review and resume home meds      Failure to thrive in adult  Therapy  NH placement      Seizure disorder  Resume home meds  Closely monitor        VTE Risk Mitigation (From admission, onward)           Ordered     IP VTE LOW RISK PATIENT  Once         02/11/24 1334     Place RACHEL hose  Until discontinued         02/11/24 1334     Place sequential compression device  Until discontinued         02/11/24 1334                    Discharge Planning   IRAM:      Code Status: Full Code   Is the patient medically ready for discharge?:     Reason for patient still in hospital (select all that apply): Other (specify) placement  Discharge Plan A: Skilled Nursing Facility   Discharge Delays: (!) Post-Acute Set-up              MARY CRISTINA DO  Department of Hospital Medicine   [facility] - Medical Surgical Unit    "

## 2024-02-18 NOTE — SUBJECTIVE & OBJECTIVE
"Interval History:     Review of Systems   Unable to perform ROS: Other   Mental disability.   Denies cp or sob. Valdemar any discomfort at this time.    Objective:     Vital Signs (Most Recent):  Temp: 99 °F (37.2 °C) (02/18/24 0737)  Pulse: 75 (02/18/24 0737)  Resp: 18 (02/18/24 0737)  BP: 107/66 (02/18/24 0737)  SpO2: 98 % (02/18/24 0737) Vital Signs (24h Range):  Temp:  [98.1 °F (36.7 °C)-99 °F (37.2 °C)] 99 °F (37.2 °C)  Pulse:  [67-78] 75  Resp:  [18] 18  SpO2:  [94 %-99 %] 98 %  BP: (104-121)/(65-78) 107/66     Weight: 89.9 kg (198 lb 3.1 oz)  Body mass index is 26.88 kg/m².    Intake/Output Summary (Last 24 hours) at 2/18/2024 0849  Last data filed at 2/18/2024 0700  Gross per 24 hour   Intake 660 ml   Output 3450 ml   Net -2790 ml           Physical Exam  HENT:      Head: Normocephalic and atraumatic.      Nose: Nose normal.   Eyes:      Conjunctiva/sclera: Conjunctivae normal.   Cardiovascular:      Rate and Rhythm: Normal rate and regular rhythm.      Pulses: Normal pulses.      Heart sounds: Normal heart sounds.   Pulmonary:      Effort: Pulmonary effort is normal.      Breath sounds: Normal breath sounds.   Abdominal:      General: Bowel sounds are normal.      Palpations: Abdomen is soft.   Musculoskeletal:         General: Normal range of motion.      Cervical back: Normal range of motion and neck supple.   Skin:     General: Skin is warm and dry.   Neurological:      General: No focal deficit present.      Mental Status: He is alert. Mental status is at baseline.   Psychiatric:         Mood and Affect: Mood is elated.         Speech: Speech is delayed.         Behavior: Behavior is cooperative.         Cognition and Memory: Cognition is impaired. Memory is impaired.             Significant Labs: All pertinent labs within the past 24 hours have been reviewed.  BMP: No results for input(s): "GLU", "NA", "K", "CL", "CO2", "BUN", "CREATININE", "CALCIUM", "MG" in the last 48 hours.  CBC: No results for " "input(s): "WBC", "HGB", "HCT", "PLT" in the last 48 hours.  CMP: No results for input(s): "NA", "K", "CL", "CO2", "GLU", "BUN", "CREATININE", "CALCIUM", "PROT", "ALBUMIN", "BILITOT", "ALKPHOS", "AST", "ALT", "ANIONGAP", "EGFRNONAA" in the last 48 hours.    Invalid input(s): "ESTGFAFR[place]"  Magnesium: No results for input(s): "MG" in the last 48 hours.    Significant Imaging: I have reviewed all pertinent imaging results/findings within the past 24 hours.  "

## 2024-02-19 PROCEDURE — 11000001 HC ACUTE MED/SURG PRIVATE ROOM

## 2024-02-19 PROCEDURE — 94761 N-INVAS EAR/PLS OXIMETRY MLT: CPT

## 2024-02-19 PROCEDURE — 25000003 PHARM REV CODE 250: Performed by: INTERNAL MEDICINE

## 2024-02-19 PROCEDURE — 25000003 PHARM REV CODE 250: Performed by: GENERAL PRACTICE

## 2024-02-19 RX ADMIN — DICLOFENAC SODIUM TOPICAL GEL, 1%, 2 G: 10 GEL TOPICAL at 09:02

## 2024-02-19 RX ADMIN — SENNOSIDES AND DOCUSATE SODIUM 1 TABLET: 50; 8.6 TABLET ORAL at 08:02

## 2024-02-19 RX ADMIN — SENNOSIDES AND DOCUSATE SODIUM 1 TABLET: 50; 8.6 TABLET ORAL at 09:02

## 2024-02-19 RX ADMIN — RISPERIDONE 1 MG: 1 TABLET ORAL at 08:02

## 2024-02-19 RX ADMIN — LEVOTHYROXINE SODIUM 50 MCG: 0.05 TABLET ORAL at 05:02

## 2024-02-19 RX ADMIN — RISPERIDONE 1 MG: 1 TABLET ORAL at 09:02

## 2024-02-19 RX ADMIN — DICLOFENAC SODIUM TOPICAL GEL, 1%, 2 G: 10 GEL TOPICAL at 08:02

## 2024-02-19 RX ADMIN — PHENOBARBITAL 129.6 MG: 32.4 TABLET ORAL at 08:02

## 2024-02-19 NOTE — PLAN OF CARE
Problem: Adult Inpatient Plan of Care  Goal: Plan of Care Review  Outcome: Ongoing, Progressing  Goal: Patient-Specific Goal (Individualized)  Outcome: Ongoing, Progressing  Goal: Absence of Hospital-Acquired Illness or Injury  Outcome: Ongoing, Progressing  Goal: Optimal Comfort and Wellbeing  Outcome: Ongoing, Progressing  Goal: Readiness for Transition of Care  Outcome: Ongoing, Progressing     Problem: Seizure, Active Management  Goal: Absence of Seizure/Seizure-Related Injury  Outcome: Ongoing, Progressing     Problem: Pain Acute  Goal: Acceptable Pain Control and Functional Ability  Outcome: Ongoing, Progressing     Problem: Fall Injury Risk  Goal: Absence of Fall and Fall-Related Injury  Outcome: Ongoing, Progressing     Problem: Skin Injury Risk Increased  Goal: Skin Health and Integrity  Outcome: Ongoing, Progressing     Problem: Cognitive Impairment (Functional Deficit)  Goal: Optimal Functional [place]  Outcome: Ongoing, Progressing     Problem: Mobility Impairment  Goal: Optimal Mobility  Outcome: Ongoing, Progressing     Problem: Communication Impairment  Goal: Effective Communication Skills  Outcome: Ongoing, Progressing

## 2024-02-19 NOTE — PLAN OF CARE
Problem: Adult Inpatient Plan of Care  Goal: Plan of Care Review  2/19/2024 0435 by Melissa Harris RN  Outcome: Ongoing, Progressing  2/19/2024 0422 by Melissa Harris RN  Flowsheets (Taken 2/19/2024 0422)  Plan of Care Reviewed With: patient  2/19/2024 0418 by Melissa Harris RN  Outcome: Ongoing, Progressing  Goal: Patient-Specific Goal (Individualized)  2/19/2024 0435 by Melissa Harris RN  Outcome: Ongoing, Progressing  2/19/2024 0422 by Melissa Harris RN  Outcome: Ongoing, Progressing  Goal: Absence of Hospital-Acquired Illness or Injury  2/19/2024 0435 by Melissa Harris RN  Outcome: Ongoing, Progressing  2/19/2024 0422 by Melissa Harris RN  Outcome: Ongoing, Progressing  Goal: Optimal Comfort and Wellbeing  2/19/2024 0435 by Melissa Harris RN  Outcome: Ongoing, Progressing  2/19/2024 0422 by Melissa Harris RN  Outcome: Ongoing, Progressing  Goal: Readiness for Transition of Care  2/19/2024 0435 by Melissa Harris RN  Outcome: Ongoing, Progressing  2/19/2024 0422 by Melissa Harris RN  Outcome: Ongoing, Progressing     Problem: Seizure, Active Management  Goal: Absence of Seizure/Seizure-Related Injury  2/19/2024 0435 by Melissa Harris RN  Outcome: Ongoing, Progressing  2/19/2024 0422 by Melissa Harris RN  Outcome: Ongoing, Progressing  2/19/2024 0418 by Melissa Harris RN  Outcome: Ongoing, Progressing     Problem: Pain Acute  Goal: Acceptable Pain Control and Functional Ability  2/19/2024 0435 by Melissa Harris RN  Outcome: Ongoing, Progressing  2/19/2024 0422 by Melissa Harris RN  Outcome: Ongoing, Progressing  Intervention: Prevent or Manage Pain  2/19/2024 0422 by Melissa Harris RN  Flowsheets (Taken 2/19/2024 0422)  Sleep/Rest Enhancement:   room darkened   regular sleep/rest pattern promoted  Bowel Elimination Promotion: adequate fluid intake promoted  Medication Review/Management: high-risk medications identified  2/19/2024 0418 by Steven  ESTHELA Torres  Flowsheets (Taken 2/19/2024 0418)  Sleep/Rest Enhancement: room darkened  Sensory Stimulation Regulation: quiet environment promoted  Bowel Elimination Promotion: adequate fluid intake promoted  Medication Review/Management: medications reviewed     Problem: Fall Injury Risk  Goal: Absence of Fall and Fall-Related Injury  Outcome: Ongoing, Progressing     Problem: Skin Injury Risk Increased  Goal: Skin Health and Integrity  Outcome: Ongoing, Progressing  Intervention: Optimize Skin Protection  Flowsheets (Taken 2/19/2024 0418)  Pressure Reduction Techniques: frequent weight shift encouraged  Skin Protection: adhesive use limited  Head of Bed (HOB) Positioning: HOB at 30-45 degrees     Problem: Cognitive Impairment (Functional Deficit)  Goal: Optimal Functional [place]  Outcome: Ongoing, Progressing  Intervention: Optimize Cognitive Function  Flowsheets (Taken 2/19/2024 0418)  Sensory Stimulation Regulation: quiet environment promoted     Problem: Mobility Impairment  Goal: Optimal Mobility  2/19/2024 0435 by Melissa Harris RN  Outcome: Ongoing, Progressing  2/19/2024 0422 by Melissa Harris RN  Outcome: Ongoing, Progressing  Intervention: Optimize Mobility  2/19/2024 0422 by Melissa Harris RN  Flowsheets (Taken 2/19/2024 0422)  Assistive Device Utilized: gait belt  Activity Management:   Arm raise - L1   Sitting at edge of bed - L2  2/19/2024 0418 by Melissa Harris RN  Flowsheets (Taken 2/19/2024 0418)  Assistive Device Utilized: gait belt  Activity Management:   Arm raise - L1   Sitting at edge of bed - L2  Positioning/Transfer Devices:   pillows   in use     Problem: Communication Impairment  Goal: Effective Communication Skills  2/19/2024 0435 by Melissa Harris RN  Outcome: Ongoing, Progressing  2/19/2024 0422 by Melissa Harris RN  Outcome: Ongoing, Progressing

## 2024-02-19 NOTE — PLAN OF CARE
Awaiting state issued 142. Updates sent to [facility] and [facility] via [employer] and some sugar. Rachel is the families number one choice.

## 2024-02-19 NOTE — PROGRESS NOTES
[facility]  [facility] Medicine  Progress Note    Patient Name: Sincere Anderson  MRN: [id number]  Patient Class: IP- Inpatient   Admission Date: 2/11/2024  Length of Stay: 8 days  Attending Physician: Anay Juarez DO  Primary Care Provider: Meeks, Claude H., MD        Subjective:     Principal Problem:Physical deconditioning        HPI:  64 yo male presents to the ED with weakness.  He fell last week and has been more weak and c/o back pain since.  As per the caregiver he is usually able to get up to assist with ADL's but now he can not.  His main caregiver was his mother but she passed away which then fell to his siter and she passed away last year.  The sister who is caring for him now is not able to care or him now since he requires more heavy lifting since his fall.  Imaging of back with X-ray and CT did not show any acute findings.  Labs were obtained which were stable.  No N/V/D/C.  No fever/chills.  No chest pain/SOB.  He does have some bruising from his falls. Due to inability to be cared for the family brought him in for possible placement.  Will get PT/OT involved as well to address his weakness.  It appears he can more all extremities.  He does have mental disability and cannot choice for himself.  He will be admitted for failure to thrive, therapy and possible placement to NH.    Overview/Hospital Course:  2/12/24-Patient is resting at this time.  His night dose of Dilantin was not given because it fell off list so an extra dose was given this morning.  He is have some mild seizure like activity.  Will closely monitor.    2/13/24-No issues today.  We are waiting for placement to NH.  CM should be back tomorrow.    2/14/24-Patient is doing good.  Still waiting for NH placement.    2/15/24-No new issues today.  The patient is stable.  Attempting to get into NH.  The family can no longer meet his needs at home therefore I believe he needs NH placement.      2/16/24-Patient  is doing good at this time.  Waiting for placement.    2/17/24-NO new issues today.  Family state he slept good last night .  Will continue waiting for placement.      2/18/24: Pt sitting up in bedside chair watching tv. He is smiling and laughing. Waiting on placement. Denies any CP, sob, pain anywhere. No acute events overnight.    2/19/24: Pt sitting up in bedside chair. He has no complaints. Niece is present. She states the pt is excited to go to his new room in the NH. We are waiting on paperwork from the [facility]. The NH did accept him.       Interval History:     Review of Systems   Unable to perform ROS: Other   Mental disability.   Denies cp or sob. Valdemar any discomfort at this time.    Objective:     Vital Signs (Most Recent):  Temp: 97.4 °F (36.3 °C) (02/19/24 1225)  Pulse: (!) 118 (02/19/24 1225)  Resp: (!) 22 (02/19/24 1225)  BP: (!) 149/93 (02/19/24 1225)  SpO2: 97 % (02/19/24 1225) Vital Signs (24h Range):  Temp:  [97.4 °F (36.3 °C)-99 °F (37.2 °C)] 97.4 °F (36.3 °C)  Pulse:  [] 118  Resp:  [18-22] 22  SpO2:  [96 %-100 %] 97 %  BP: (106-149)/(64-93) 149/93     Weight: 87.5 kg (193 lb)  Body mass index is 26.18 kg/m².    Intake/Output Summary (Last 24 hours) at 2/19/2024 1458  Last data filed at 2/19/2024 1200  Gross per 24 hour   Intake 835 ml   Output 1375 ml   Net -540 ml           Physical Exam  HENT:      Head: Normocephalic and atraumatic.      Nose: Nose normal.   Eyes:      Conjunctiva/sclera: Conjunctivae normal.   Cardiovascular:      Rate and Rhythm: Normal rate and regular rhythm.      Pulses: Normal pulses.      Heart sounds: Normal heart sounds.   Pulmonary:      Effort: Pulmonary effort is normal.      Breath sounds: Normal breath sounds.   Abdominal:      General: Bowel sounds are normal.      Palpations: Abdomen is soft.   Musculoskeletal:         General: Normal range of motion.      Cervical back: Normal range of motion and neck supple.   Skin:     General: Skin is warm and dry.  "  Neurological:      General: No focal deficit present.      Mental Status: He is alert. Mental status is at baseline.   Psychiatric:         Mood and Affect: Mood is elated.         Speech: Speech is delayed.         Behavior: Behavior is cooperative.         Cognition and Memory: Cognition is impaired. Memory is impaired.             Significant Labs: All pertinent labs within the past 24 hours have been reviewed.  BMP: No results for input(s): "GLU", "NA", "K", "CL", "CO2", "BUN", "CREATININE", "CALCIUM", "MG" in the last 48 hours.  CBC: No results for input(s): "WBC", "HGB", "HCT", "PLT" in the last 48 hours.  CMP: No results for input(s): "NA", "K", "CL", "CO2", "GLU", "BUN", "CREATININE", "CALCIUM", "PROT", "ALBUMIN", "BILITOT", "ALKPHOS", "AST", "ALT", "ANIONGAP", "EGFRNONAA" in the last 48 hours.    Invalid input(s): "ESTGFAFR[place]"  Magnesium: No results for input(s): "MG" in the last 48 hours.    Significant Imaging: I have reviewed all pertinent imaging results/findings within the past 24 hours.    Assessment/Plan:      * Physical deconditioning  Admit to inpatient  Consult PT/OT  Follow labs  Resume home meds  CM for placement      Mental developmental delay  Review and resume home meds      Failure to thrive in adult  Therapy  NH placement      Seizure disorder  Resume home meds  Closely monitor        VTE Risk Mitigation (From admission, onward)           Ordered     IP VTE LOW RISK PATIENT  Once         02/11/24 1334     Place RACHEL hose  Until discontinued         02/11/24 1334     Place sequential compression device  Until discontinued         02/11/24 1334                    Discharge Planning   IRMA:      Code Status: Full Code   Is the patient medically ready for discharge?:     Reason for patient still in hospital (select all that apply): Other (specify) paperwork from the state for NH  Discharge Plan A: Skilled Nursing Facility   Discharge Delays: (!) Post-Acute Set-up              MARY " DO IBETH  Department of Hospital Medicine   [facility] - Medical Surgical Unit

## 2024-02-19 NOTE — SUBJECTIVE & OBJECTIVE
"Interval History:     Review of Systems   Unable to perform ROS: Other   Mental disability.   Denies cp or sob. Valdemar any discomfort at this time.    Objective:     Vital Signs (Most Recent):  Temp: 97.4 °F (36.3 °C) (02/19/24 1225)  Pulse: (!) 118 (02/19/24 1225)  Resp: (!) 22 (02/19/24 1225)  BP: (!) 149/93 (02/19/24 1225)  SpO2: 97 % (02/19/24 1225) Vital Signs (24h Range):  Temp:  [97.4 °F (36.3 °C)-99 °F (37.2 °C)] 97.4 °F (36.3 °C)  Pulse:  [] 118  Resp:  [18-22] 22  SpO2:  [96 %-100 %] 97 %  BP: (106-149)/(64-93) 149/93     Weight: 87.5 kg (193 lb)  Body mass index is 26.18 kg/m².    Intake/Output Summary (Last 24 hours) at 2/19/2024 1458  Last data filed at 2/19/2024 1200  Gross per 24 hour   Intake 835 ml   Output 1375 ml   Net -540 ml           Physical Exam  HENT:      Head: Normocephalic and atraumatic.      Nose: Nose normal.   Eyes:      Conjunctiva/sclera: Conjunctivae normal.   Cardiovascular:      Rate and Rhythm: Normal rate and regular rhythm.      Pulses: Normal pulses.      Heart sounds: Normal heart sounds.   Pulmonary:      Effort: Pulmonary effort is normal.      Breath sounds: Normal breath sounds.   Abdominal:      General: Bowel sounds are normal.      Palpations: Abdomen is soft.   Musculoskeletal:         General: Normal range of motion.      Cervical back: Normal range of motion and neck supple.   Skin:     General: Skin is warm and dry.   Neurological:      General: No focal deficit present.      Mental Status: He is alert. Mental status is at baseline.   Psychiatric:         Mood and Affect: Mood is elated.         Speech: Speech is delayed.         Behavior: Behavior is cooperative.         Cognition and Memory: Cognition is impaired. Memory is impaired.             Significant Labs: All pertinent labs within the past 24 hours have been reviewed.  BMP: No results for input(s): "GLU", "NA", "K", "CL", "CO2", "BUN", "CREATININE", "CALCIUM", "MG" in the last 48 hours.  CBC: No " "results for input(s): "WBC", "HGB", "HCT", "PLT" in the last 48 hours.  CMP: No results for input(s): "NA", "K", "CL", "CO2", "GLU", "BUN", "CREATININE", "CALCIUM", "PROT", "ALBUMIN", "BILITOT", "ALKPHOS", "AST", "ALT", "ANIONGAP", "EGFRNONAA" in the last 48 hours.    Invalid input(s): "ESTGFAFR[place]"  Magnesium: No results for input(s): "MG" in the last 48 hours.    Significant Imaging: I have reviewed all pertinent imaging results/findings within the past 24 hours.  "

## 2024-02-20 VITALS
DIASTOLIC BLOOD PRESSURE: 63 MMHG | RESPIRATION RATE: 18 BRPM | OXYGEN SATURATION: 99 % | BODY MASS INDEX: 26.14 KG/M2 | SYSTOLIC BLOOD PRESSURE: 97 MMHG | WEIGHT: 193 LBS | HEART RATE: 77 BPM | HEIGHT: 72 IN | TEMPERATURE: 99 F

## 2024-02-20 PROCEDURE — 25000003 PHARM REV CODE 250: Performed by: GENERAL PRACTICE

## 2024-02-20 PROCEDURE — 94761 N-INVAS EAR/PLS OXIMETRY MLT: CPT

## 2024-02-20 PROCEDURE — 25000003 PHARM REV CODE 250: Performed by: INTERNAL MEDICINE

## 2024-02-20 RX ORDER — PHENYTOIN SODIUM 100 MG/1
100 CAPSULE, EXTENDED RELEASE ORAL DAILY
Start: 2024-02-20 | End: 2024-02-21

## 2024-02-20 RX ORDER — PHENYTOIN SODIUM 100 MG/1
200 CAPSULE, EXTENDED RELEASE ORAL NIGHTLY
Start: 2024-02-20

## 2024-02-20 RX ADMIN — RISPERIDONE 1 MG: 1 TABLET ORAL at 08:02

## 2024-02-20 RX ADMIN — DICLOFENAC SODIUM TOPICAL GEL, 1%, 2 G: 10 GEL TOPICAL at 08:02

## 2024-02-20 RX ADMIN — SENNOSIDES AND DOCUSATE SODIUM 1 TABLET: 50; 8.6 TABLET ORAL at 08:02

## 2024-02-20 RX ADMIN — LEVOTHYROXINE SODIUM 50 MCG: 0.05 TABLET ORAL at 05:02

## 2024-02-20 NOTE — NURSING
Patient escorted off of the floor, via wheelchair, by transport team. Family with patient. Personal belongings with family. No distress noted.

## 2024-02-20 NOTE — PROGRESS NOTES
02/20/24 0804   Discharge Reassessment   Assessment Type Discharge Planning Reassessment   Did the patient's condition or plan change since previous assessment? No   Discharge Plan discussed with: Patient  (Nejimena)   Name(s) and Number(s) Celia   Discharge Plan A Skilled Nursing Facility   DME Needed Upon Discharge  none   Transition of Care Barriers Other (see comments)  (Awaiting 142 for developmental disability)   Why the patient remains in the hospital Placement issues   Post-Acute Status   Post-Acute Authorization Placement   Post-Acute Placement Status Set-up Complete/Auth obtained   Coverage Medicare   Discharge Delays   (Awaiting 142)     [facility] accepted once 142 received from [facility]. Family & patient aware of plan.

## 2024-02-20 NOTE — PROGRESS NOTES
[facility]  [facility]  [facility] Note    Patient Name: Sincere Anderson  MRN: [id number]  Patient Class: IP- Inpatient   Admission Date: 2/11/2024  Length of Stay: 9 days  Attending Physician: Anay Juarez DO  Primary Care Provider: Meeks, Claude H., MD        Subjective:     Principal Problem:Physical deconditioning        HPI:  62 yo male presents to the ED with weakness.  He fell last week and has been more weak and c/o back pain since.  As per the caregiver he is usually able to get up to assist with ADL's but now he can not.  His main caregiver was his mother but she passed away which then fell to his siter and she passed away last year.  The sister who is caring for him now is not able to care or him now since he requires more heavy lifting since his fall.  Imaging of back with X-ray and CT did not show any acute findings.  Labs were obtained which were stable.  No N/V/D/C.  No fever/chills.  No chest pain/SOB.  He does have some bruising from his falls. Due to inability to be cared for the family brought him in for possible placement.  Will get PT/OT involved as well to address his weakness.  It appears he can more all extremities.  He does have mental disability and cannot choice for himself.  He will be admitted for failure to thrive, therapy and possible placement to NH.    Overview/Hospital Course:  2/12/24-Patient is resting at this time.  His night dose of Dilantin was not given because it fell off list so an extra dose was given this morning.  He is have some mild seizure like activity.  Will closely monitor.    2/13/24-No issues today.  We are waiting for placement to NH.  CM should be back tomorrow.    2/14/24-Patient is doing good.  Still waiting for NH placement.    2/15/24-No new issues today.  The patient is stable.  Attempting to get into NH.  The family can no longer meet his needs at home therefore I believe he needs NH placement.      2/16/24-Patient  is doing good at this time.  Waiting for placement.    2/17/24-NO new issues today.  Family state he slept good last night .  Will continue waiting for placement.      2/18/24: Pt sitting up in bedside chair watching tv. He is smiling and laughing. Waiting on placement. Denies any CP, sob, pain anywhere. No acute events overnight.    2/19/24: Pt sitting up in bedside chair. He has no complaints. Niece is present. She states the pt is excited to go to his new room in the NH. [facility] The [facility] did accept him.     2/20/24-No issues at this time.  Waiting on the [facility] to approve him going to NH.  He has been accepted.    Interval History:     Review of Systems   Unable to perform ROS: Psychiatric disorder     Objective:     Vital Signs (Most Recent):  Temp: 97.7 °F (36.5 °C) (02/20/24 0742)  Pulse: 71 (02/20/24 0742)  Resp: 18 (02/20/24 0742)  BP: 102/69 (02/20/24 0742)  SpO2: 100 % (02/20/24 0742) Vital Signs (24h Range):  Temp:  [97.4 °F (36.3 °C)-98.1 °F (36.7 °C)] 97.7 °F (36.5 °C)  Pulse:  [] 71  Resp:  [18-22] 18  SpO2:  [97 %-100 %] 100 %  BP: (102-149)/(62-93) 102/69     Weight: 87.5 kg (193 lb)  Body mass index is 26.18 kg/m².    Intake/Output Summary (Last 24 hours) at 2/20/2024 1101  Last data filed at 2/20/2024 0002  Gross per 24 hour   Intake 595 ml   Output 2300 ml   Net -1705 ml         Physical Exam  Constitutional:       General: He is awake.      Appearance: He is normal weight.   HENT:      Head: Normocephalic and atraumatic.      Nose: Nose normal.      Mouth/Throat:      Mouth: Mucous membranes are moist.      Pharynx: Oropharynx is clear.   Eyes:      Extraocular Movements: Extraocular movements intact.      Conjunctiva/sclera: Conjunctivae normal.      Pupils: Pupils are equal, round, and reactive to light.   Cardiovascular:      Rate and Rhythm: Normal rate and regular rhythm.      Pulses: Normal pulses.      Heart sounds: Normal heart sounds.   Pulmonary:  "     Effort: Pulmonary effort is normal.      Breath sounds: Normal breath sounds.   Abdominal:      General: Bowel sounds are normal.      Palpations: Abdomen is soft.   Musculoskeletal:         General: Normal range of motion.      Cervical back: Normal range of motion and neck supple.   Skin:     General: Skin is warm and dry.      Capillary Refill: Capillary refill takes 2 to 3 seconds.   Neurological:      General: No focal deficit present.      Mental Status: Mental status is at baseline.   Psychiatric:         Mood and Affect: Mood is elated.         Speech: Speech is rapid and pressured.         Behavior: Behavior is cooperative.         Cognition and Memory: Cognition is impaired. Memory is impaired.             Significant Labs: All pertinent labs within the past 24 hours have been reviewed.  BMP: No results for input(s): "GLU", "NA", "K", "CL", "CO2", "BUN", "CREATININE", "CALCIUM", "MG" in the last 48 hours.  CBC: No results for input(s): "WBC", "HGB", "HCT", "PLT" in the last 48 hours.  CMP: No results for input(s): "NA", "K", "CL", "CO2", "GLU", "BUN", "CREATININE", "CALCIUM", "PROT", "ALBUMIN", "BILITOT", "ALKPHOS", "AST", "ALT", "ANIONGAP", "EGFRNONAA" in the last 48 hours.    Invalid input(s): "ESTGFAFRICA"  Magnesium: No results for input(s): "MG" in the last 48 hours.    Significant Imaging: I have reviewed all pertinent imaging results/findings within the past 24 hours.    Assessment/Plan:      * Physical deconditioning  Admit to inpatient  Consult PT/OT  Follow labs  Resume home meds  CM for placement      Mental developmental delay  Review and resume home meds      Failure to thrive in adult  Therapy  NH placement      Seizure disorder  Resume home meds  Closely monitor        VTE Risk Mitigation (From admission, onward)           Ordered     IP VTE LOW RISK PATIENT  Once         02/11/24 1334     Place RACHEL hose  Until discontinued         02/11/24 1334     Place sequential compression device  " Until discontinued         02/11/24 1334                  Waiting for placement  No changes at this time  Discharge Planning   IARM:      Code Status: Full Code   Is the patient medically ready for discharge?:     Reason for patient still in hospital (select all that apply): Patient trending condition, Treatment, PT / OT recommendations, and Pending disposition  Discharge Plan A: Skilled Nursing Facility   Discharge Delays:  (Awaiting 142)              Milton Vega MD  Department of Hospital Medicine   [facility] - Medical Surgical Unit

## 2024-02-20 NOTE — PLAN OF CARE
Problem: Adult Inpatient Plan of Care  Goal: Patient-Specific Goal (Individualized)  Outcome: Ongoing, Progressing  Goal: Optimal Comfort and Wellbeing  Outcome: Ongoing, Progressing  Goal: Readiness for Transition of Care  Outcome: Ongoing, Progressing     Problem: Seizure, Active Management  Goal: Absence of Seizure/Seizure-Related Injury  Outcome: Ongoing, Progressing     Problem: Pain Acute  Goal: Acceptable Pain Control and Functional Ability  Outcome: Ongoing, Progressing     Problem: Communication Impairment  Goal: Effective Communication Skills  Outcome: Ongoing, Progressing

## 2024-02-20 NOTE — DISCHARGE SUMMARY
[facility]  [facility] Medicine  Discharge Summary      Patient Name: Sincere Anderson  MRN: [id number]  BRIA: [id number]  Patient Class: IP- Inpatient  Admission Date: 2/11/2024  Hospital Length of Stay: 9 days  Discharge Date and Time:  02/20/2024 2:15 PM  Attending Physician: Anay Juarez DO   Discharging Provider: Mitlon Vega MD  Primary Care Provider: Meeks, Claude H., MD    Primary Care Team: Networked reference to record PCT     HPI:   64 yo male presents to the ED with weakness.  He fell last week and has been more weak and c/o back pain since.  As per the caregiver he is usually able to get up to assist with ADL's but now he can not.  His main caregiver was his mother but she passed away which then fell to his siter and she passed away last year.  The sister who is caring for him now is not able to care or him now since he requires more heavy lifting since his fall.  Imaging of back with X-ray and CT did not show any acute findings.  Labs were obtained which were stable.  No N/V/D/C.  No fever/chills.  No chest pain/SOB.  He does have some bruising from his falls. Due to inability to be cared for the family brought him in for possible placement.  Will get PT/OT involved as well to address his weakness.  It appears he can more all extremities.  He does have mental disability and cannot choice for himself.  He will be admitted for failure to thrive, therapy and possible placement to NH.    * No surgery found *      Hospital Course:   2/12/24-Patient is resting at this time.  His night dose of Dilantin was not given because it fell off list so an extra dose was given this morning.  He is have some mild seizure like activity.  Will closely monitor.    2/13/24-No issues today.  We are waiting for placement to NH.  CM should be back tomorrow.    2/14/24-Patient is doing good.  Still waiting for NH placement.    2/15/24-No new issues today.  The patient is stable.   Attempting to get into NH.  The family can no longer meet his needs at home therefore I believe he needs NH placement.      2/16/24-Patient is doing good at this time.  Waiting for placement.    2/17/24-NO new issues today.  Family state he slept good last night .  Will continue waiting for placement.      2/18/24: Pt sitting up in bedside chair watching tv. He is smiling and laughing. Waiting on placement. Denies any CP, sob, pain anywhere. No acute events overnight.    2/19/24: Pt sitting up in bedside chair. He has no complaints. Niece is present. She states the pt is excited to go to his new room in the NH. We are waiting on paperwork from the state. The NH did accept him.     2/20/24-No issues at this time.  Waiting on the State to approve him going to NH.  He has been accepted.    2/20/24-Patient has been accepted to NH .  He will be transferred in stable condition.  Exam(awake, RRR, CTA, BS +, NTTP, ROM I)     Goals of Care Treatment Preferences:  Code Status: Full Code      Consults:   Consults (From admission, onward)          Status Ordering Provider     Inpatient consult to Social Work/Case Management  Once        Provider:  (Not yet assigned)    Acknowledged EUN HARRIS            No new Assessment & Plan notes have been filed under this hospital service since the last note was generated.  Service: Hospital Medicine    Final Active Diagnoses:    Diagnosis Date Noted POA    PRINCIPAL PROBLEM:  Physical deconditioning [R53.81] 02/11/2024 Yes    Failure to thrive in adult [R62.7] 02/11/2024 Yes    Mental developmental delay [F81.9] 02/11/2024 Yes    Seizure disorder [G40.909] 03/17/2023 Yes      Problems Resolved During this Admission:       Discharged Condition: stable    Disposition: halfway Nursing Facili*    Follow Up:   Follow-up Information       Meeks, Claude H., MD Follow up in 1 week(s).    Specialty: Family Medicine  Contact information:  [address] [place] [address] DR Erick BALLARD  [id number]  [phone]                           Patient Instructions:   No discharge procedures on file.    Significant Diagnostic Studies: N/A    Pending Diagnostic Studies:       None           Medications:  Reconciled Home Medications:      Medication List        CONTINUE taking these medications      cholecalciferol (vitamin D3) 1,250 mcg (50,000 unit) capsule  Take 50,000 Units by mouth every 7 days.     * DILANTIN EXTENDED 100 MG ER capsule  Generic drug: phenytoin  Take 100 mg by mouth once daily.     * phenytoin 100 MG ER capsule  Commonly known as: DILANTIN  Take 200 mg by mouth every evening.     fexofenadine 60 MG tablet  Commonly known as: ALLEGRA  Take 180 mg by mouth once daily.     levothyroxine 50 MCG tablet  Commonly known as: SYNTHROID  Take 50 mcg by mouth every morning.     PHENobarbitaL 30 MG tablet  Commonly known as: LUMINAL  Take 120 mg by mouth nightly.     pravastatin 40 MG tablet  Commonly known as: PRAVACHOL  Take 40 mg by mouth every evening.     QUEtiapine 50 MG tablet  Commonly known as: SEROQUEL  Take 50 mg by mouth 2 (two) times daily as needed.     risperiDONE 1 MG Tbdl  Commonly known as: RISPERDAL M-TABS  Take 1 mg by mouth 2 (two) times daily.           * This list has 2 medication(s) that are the same as other medications prescribed for you. Read the directions carefully, and ask your doctor or other care provider to review them with you.                  Indwelling Lines/Drains at time of discharge:   Lines/Drains/Airways       None                   Time spent on the discharge of patient: 37 minutes         Milton Vega MD  Department of Hospital Medicine  [facility] - Medical Surgical Unit

## 2024-02-20 NOTE — PLAN OF CARE
Problem: Adult Inpatient Plan of Care  Goal: Plan of Care Review  Outcome: Ongoing, Progressing  Goal: Patient-Specific Goal (Individualized)  Outcome: Ongoing, Progressing  Goal: Absence of Hospital-Acquired Illness or Injury  Outcome: Ongoing, Progressing  Goal: Optimal Comfort and Wellbeing  Outcome: Ongoing, Progressing  Goal: Readiness for Transition of Care  Outcome: Ongoing, Progressing     Problem: Seizure, Active Management  Goal: Absence of Seizure/Seizure-Related Injury  Outcome: Ongoing, Progressing     Problem: Pain Acute  Goal: Acceptable Pain Control and Functional Ability  Outcome: Ongoing, Progressing     Problem: Fall Injury Risk  Goal: Absence of Fall and Fall-Related Injury  Outcome: Ongoing, Progressing     Problem: Skin Injury Risk Increased  Goal: Skin Health and Integrity  Outcome: Ongoing, Progressing     Problem: Cognitive Impairment (Functional Deficit)  Goal: Optimal Functional [place]  Outcome: Ongoing, Progressing     Problem: Mobility Impairment  Goal: Optimal Mobility  Outcome: Ongoing, Progressing     Problem: Communication Impairment  Goal: Effective Communication Skills  Outcome: Ongoing, Progressing

## 2024-02-20 NOTE — PLAN OF CARE
Received 142. Faxed to Rachel Harrison. Discharge summary sent via [facility].

## 2024-02-20 NOTE — SUBJECTIVE & OBJECTIVE
Interval History:     Review of Systems   Unable to perform ROS: Psychiatric disorder     Objective:     Vital Signs (Most Recent):  Temp: 97.7 °F (36.5 °C) (02/20/24 0742)  Pulse: 71 (02/20/24 0742)  Resp: 18 (02/20/24 0742)  BP: 102/69 (02/20/24 0742)  SpO2: 100 % (02/20/24 0742) Vital Signs (24h Range):  Temp:  [97.4 °F (36.3 °C)-98.1 °F (36.7 °C)] 97.7 °F (36.5 °C)  Pulse:  [] 71  Resp:  [18-22] 18  SpO2:  [97 %-100 %] 100 %  BP: (102-149)/(62-93) 102/69     Weight: 87.5 kg (193 lb)  Body mass index is 26.18 kg/m².    Intake/Output Summary (Last 24 hours) at 2/20/2024 1101  Last data filed at 2/20/2024 0002  Gross per 24 hour   Intake 595 ml   Output 2300 ml   Net -1705 ml         Physical Exam  Constitutional:       General: He is awake.      Appearance: He is normal weight.   HENT:      Head: Normocephalic and atraumatic.      Nose: Nose normal.      Mouth/Throat:      Mouth: Mucous membranes are moist.      Pharynx: Oropharynx is clear.   Eyes:      Extraocular Movements: Extraocular movements intact.      Conjunctiva/sclera: Conjunctivae normal.      Pupils: Pupils are equal, round, and reactive to light.   Cardiovascular:      Rate and Rhythm: Normal rate and regular rhythm.      Pulses: Normal pulses.      Heart sounds: Normal heart sounds.   Pulmonary:      Effort: Pulmonary effort is normal.      Breath sounds: Normal breath sounds.   Abdominal:      General: Bowel sounds are normal.      Palpations: Abdomen is soft.   Musculoskeletal:         General: Normal range of motion.      Cervical back: Normal range of motion and neck supple.   Skin:     General: Skin is warm and dry.      Capillary Refill: Capillary refill takes 2 to 3 seconds.   Neurological:      General: No focal deficit present.      Mental Status: Mental status is at baseline.   Psychiatric:         Mood and Affect: Mood is elated.         Speech: Speech is rapid and pressured.         Behavior: Behavior is cooperative.          "Cognition and Memory: Cognition is impaired. Memory is impaired.             Significant Labs: All pertinent labs within the past 24 hours have been reviewed.  BMP: No results for input(s): "GLU", "NA", "K", "CL", "CO2", "BUN", "CREATININE", "CALCIUM", "MG" in the last 48 hours.  CBC: No results for input(s): "WBC", "HGB", "HCT", "PLT" in the last 48 hours.  CMP: No results for input(s): "NA", "K", "CL", "CO2", "GLU", "BUN", "CREATININE", "CALCIUM", "PROT", "ALBUMIN", "BILITOT", "ALKPHOS", "AST", "ALT", "ANIONGAP", "EGFRNONAA" in the last 48 hours.    Invalid input(s): "ESTGFAFRICA"  Magnesium: No results for input(s): "MG" in the last 48 hours.    Significant Imaging: I have reviewed all pertinent imaging results/findings within the past 24 hours.  "

## 2024-02-21 ENCOUNTER — HOSPITAL ENCOUNTER (EMERGENCY)
Facility: HOSPITAL | Age: 64
Discharge: HOME | End: 2024-02-22
Attending: STUDENT IN AN ORGANIZED HEALTH CARE EDUCATION/TRAINING PROGRAM
Payer: MEDICARE

## 2024-02-21 VITALS
BODY MASS INDEX: 27.09 KG/M2 | RESPIRATION RATE: 15 BRPM | SYSTOLIC BLOOD PRESSURE: 128 MMHG | TEMPERATURE: 99 F | HEART RATE: 84 BPM | DIASTOLIC BLOOD PRESSURE: 69 MMHG | WEIGHT: 200 LBS | OXYGEN SATURATION: 97 % | HEIGHT: 72 IN

## 2024-02-21 DIAGNOSIS — G40.919 BREAKTHROUGH SEIZURE: Primary | ICD-10-CM

## 2024-02-21 DIAGNOSIS — R56.9 SEIZURE: ICD-10-CM

## 2024-02-21 LAB
ALBUMIN SERPL-MCNC: 3 G/DL (ref 3.4–4.8)
ALBUMIN/GLOB SERPL: 0.8 RATIO (ref 1.1–2)
ALP SERPL-CCNC: 155 UNIT/L (ref 40–150)
ALT SERPL-CCNC: 34 UNIT/L (ref 0–55)
APPEARANCE UR: CLEAR
AST SERPL-CCNC: 25 UNIT/L (ref 5–34)
BACTERIA #/AREA URNS AUTO: NORMAL /HPF
BASOPHILS # BLD AUTO: 0.03 X10(3)/MCL
BASOPHILS NFR BLD AUTO: 0.3 %
BILIRUB SERPL-MCNC: 0.2 MG/DL
BILIRUB UR QL STRIP.AUTO: NEGATIVE
BUN SERPL-MCNC: 13.4 MG/DL (ref 8.4–25.7)
CALCIUM SERPL-MCNC: 9.2 MG/DL (ref 8.8–10)
CHLORIDE SERPL-SCNC: 104 MMOL/L (ref 98–107)
CO2 SERPL-SCNC: 24 MMOL/L (ref 23–31)
COLOR UR AUTO: NORMAL
CREAT SERPL-MCNC: 0.67 MG/DL (ref 0.73–1.18)
EOSINOPHIL # BLD AUTO: 0.04 X10(3)/MCL (ref 0–0.9)
EOSINOPHIL NFR BLD AUTO: 0.3 %
ERYTHROCYTE [DISTWIDTH] IN BLOOD BY AUTOMATED COUNT: 12.4 % (ref 11.5–17)
GFR SERPLBLD CREATININE-BSD FMLA CKD-EPI: >60 MLS/MIN/1.73/M2
GLOBULIN SER-MCNC: 3.8 GM/DL (ref 2.4–3.5)
GLUCOSE SERPL-MCNC: 145 MG/DL (ref 82–115)
GLUCOSE UR QL STRIP.AUTO: NORMAL
HCT VFR BLD AUTO: 34.7 % (ref 42–52)
HGB BLD-MCNC: 12.2 G/DL (ref 14–18)
IMM GRANULOCYTES # BLD AUTO: 0.11 X10(3)/MCL (ref 0–0.04)
IMM GRANULOCYTES NFR BLD AUTO: 0.9 %
INR PPP: 1
KETONES UR QL STRIP.AUTO: NEGATIVE
LACTATE SERPL-SCNC: 1.4 MMOL/L (ref 0.5–2.2)
LEUKOCYTE ESTERASE UR QL STRIP.AUTO: NEGATIVE
LYMPHOCYTES # BLD AUTO: 1.49 X10(3)/MCL (ref 0.6–4.6)
LYMPHOCYTES NFR BLD AUTO: 12.7 %
MAGNESIUM SERPL-MCNC: 2 MG/DL (ref 1.6–2.6)
MCH RBC QN AUTO: 32.1 PG (ref 27–31)
MCHC RBC AUTO-ENTMCNC: 35.2 G/DL (ref 33–36)
MCV RBC AUTO: 91.3 FL (ref 80–94)
MONOCYTES # BLD AUTO: 0.88 X10(3)/MCL (ref 0.1–1.3)
MONOCYTES NFR BLD AUTO: 7.5 %
NEUTROPHILS # BLD AUTO: 9.21 X10(3)/MCL (ref 2.1–9.2)
NEUTROPHILS NFR BLD AUTO: 78.3 %
NITRITE UR QL STRIP.AUTO: NEGATIVE
NRBC BLD AUTO-RTO: 0 %
PH UR STRIP.AUTO: 7.5 [PH]
PHENYTOIN SERPL-MCNC: 6.1 UG/ML (ref 10–20)
PLATELET # BLD AUTO: 227 X10(3)/MCL (ref 130–400)
PMV BLD AUTO: 9.6 FL (ref 7.4–10.4)
POTASSIUM SERPL-SCNC: 4.1 MMOL/L (ref 3.5–5.1)
PROT SERPL-MCNC: 6.8 GM/DL (ref 5.8–7.6)
PROT UR QL STRIP.AUTO: NEGATIVE
PROTHROMBIN TIME: 13.1 SECONDS (ref 12.5–14.5)
RBC # BLD AUTO: 3.8 X10(6)/MCL (ref 4.7–6.1)
RBC #/AREA URNS AUTO: NORMAL /HPF
RBC UR QL AUTO: NEGATIVE
SODIUM SERPL-SCNC: 135 MMOL/L (ref 136–145)
SP GR UR STRIP.AUTO: 1.01 (ref 1–1.03)
SQUAMOUS #/AREA URNS LPF: NORMAL /HPF
TROPONIN I SERPL-MCNC: 0.01 NG/ML (ref 0–0.04)
UROBILINOGEN UR STRIP-ACNC: NORMAL
WBC # SPEC AUTO: 11.76 X10(3)/MCL (ref 4.5–11.5)
WBC #/AREA URNS AUTO: NORMAL /HPF

## 2024-02-21 PROCEDURE — 80053 COMPREHEN METABOLIC PANEL: CPT | Performed by: STUDENT IN AN ORGANIZED HEALTH CARE EDUCATION/TRAINING PROGRAM

## 2024-02-21 PROCEDURE — 80185 ASSAY OF PHENYTOIN TOTAL: CPT | Performed by: STUDENT IN AN ORGANIZED HEALTH CARE EDUCATION/TRAINING PROGRAM

## 2024-02-21 PROCEDURE — 83605 ASSAY OF LACTIC ACID: CPT | Performed by: STUDENT IN AN ORGANIZED HEALTH CARE EDUCATION/TRAINING PROGRAM

## 2024-02-21 PROCEDURE — 96374 THER/PROPH/DIAG INJ IV PUSH: CPT

## 2024-02-21 PROCEDURE — 84484 ASSAY OF TROPONIN QUANT: CPT | Performed by: STUDENT IN AN ORGANIZED HEALTH CARE EDUCATION/TRAINING PROGRAM

## 2024-02-21 PROCEDURE — 96361 HYDRATE IV INFUSION ADD-ON: CPT

## 2024-02-21 PROCEDURE — 99285 EMERGENCY DEPT VISIT HI MDM: CPT | Mod: 25

## 2024-02-21 PROCEDURE — 83735 ASSAY OF MAGNESIUM: CPT | Performed by: STUDENT IN AN ORGANIZED HEALTH CARE EDUCATION/TRAINING PROGRAM

## 2024-02-21 PROCEDURE — 85610 PROTHROMBIN TIME: CPT | Performed by: STUDENT IN AN ORGANIZED HEALTH CARE EDUCATION/TRAINING PROGRAM

## 2024-02-21 PROCEDURE — 63600175 PHARM REV CODE 636 W HCPCS: Mod: JZ,JG | Performed by: STUDENT IN AN ORGANIZED HEALTH CARE EDUCATION/TRAINING PROGRAM

## 2024-02-21 PROCEDURE — 81001 URINALYSIS AUTO W/SCOPE: CPT | Performed by: STUDENT IN AN ORGANIZED HEALTH CARE EDUCATION/TRAINING PROGRAM

## 2024-02-21 PROCEDURE — 25000003 PHARM REV CODE 250: Performed by: STUDENT IN AN ORGANIZED HEALTH CARE EDUCATION/TRAINING PROGRAM

## 2024-02-21 PROCEDURE — 85025 COMPLETE CBC W/AUTO DIFF WBC: CPT | Performed by: STUDENT IN AN ORGANIZED HEALTH CARE EDUCATION/TRAINING PROGRAM

## 2024-02-21 RX ORDER — PHENOBARBITAL 30 MG/1
120 TABLET ORAL NIGHTLY
Qty: 120 TABLET | Refills: 0 | Status: SHIPPED | OUTPATIENT
Start: 2024-02-21

## 2024-02-21 RX ORDER — SODIUM CHLORIDE 9 MG/ML
1000 INJECTION, SOLUTION INTRAVENOUS
Status: COMPLETED | OUTPATIENT
Start: 2024-02-21 | End: 2024-02-21

## 2024-02-21 RX ORDER — LEVETIRACETAM 15 MG/ML
1500 INJECTION INTRAVASCULAR
Status: DISCONTINUED | OUTPATIENT
Start: 2024-02-21 | End: 2024-02-21

## 2024-02-21 RX ORDER — PHENYTOIN SODIUM 100 MG/1
100 CAPSULE, EXTENDED RELEASE ORAL DAILY
Qty: 30 CAPSULE | Refills: 0 | Status: SHIPPED | OUTPATIENT
Start: 2024-02-21

## 2024-02-21 RX ADMIN — DEXTROSE MONOHYDRATE 1000 MG PE: 50 INJECTION, SOLUTION INTRAVENOUS at 06:02

## 2024-02-21 RX ADMIN — SODIUM CHLORIDE 1000 ML: 9 INJECTION, SOLUTION INTRAVENOUS at 06:02

## 2024-02-21 NOTE — PT/OT/SLP DISCHARGE
Occupational Therapy Discharge Summary    Sincere Anderson  MRN: [id number]   Principal Problem: Physical deconditioning      Patient Discharged from acute Occupational Therapy on 02/20/24.  Please refer to prior OT note dated 02/15/24 for functional status.    Assessment:      Patient appropriate for care in another setting.    Objective:     GOALS:   Multidisciplinary Problems       Occupational Therapy Goals          Problem: Occupational Therapy    Goal Priority Disciplines Outcome Interventions   Occupational Therapy Goal     OT, PT/OT Ongoing, Progressing    Description:   Patient will increase functional independence with ADLs by performing:    Feeding with Set-up Assistance. (Goal Met)  Grooming while seated with Contact Guard Assistance. (Goal Not Met)  Toilet transfer to bedside commode with Contact Guard Assistance.(Goal Not Met)                         Reasons for Discontinuation of Therapy Services  Transfer to alternate level of care.      Plan:     Patient Discharged to:  NH long-term    2/21/2024

## 2024-02-21 NOTE — ED PROVIDER NOTES
Encounter Date: 2/21/2024    SCRIBE #1 NOTE: I, Ping López, am scribing for, and in the presence of,  Arnulfo Cabrera MD. I have scribed the following portions of the note - Other sections scribed: HPI, ROS, PE.       History     Chief Complaint   Patient presents with    Seizures     Pt. From [facility] s/t seizures x 3 today, pmh seizures, takes Dilantin. EMS unsure if pt. Took meds today as it is first day at NH.      63 year old male with a past medical history of mental disability and seizures presents to the ED via EMS from [facility] for seizure activity occurring today. Per EMS, patient had 3 seizures today. He takes Dilantin, but they are unsure if he took his medication today because it is his first day at the nursing home. Per EMS, patient is at [facility] baseline.     The HPI is limited due to patient being nonverbal.    The history is limited by the condition of the patient.     Review of patient's allergies indicates:   Allergen Reactions    Iodine      Past Medical History:   Diagnosis Date    Mental disability     Seizures     Thyroid disease      Past Surgical History:   Procedure Laterality Date    CHOLECYSTECTOMY       Family History   Problem Relation Age of Onset    Diabetes Mother     Hypertension Father      Social History     Tobacco Use    Smoking status: Never    Smokeless tobacco: Never   Substance Use Topics    Alcohol use: Never    Drug use: Never     Review of Systems   Unable to perform ROS: Patient nonverbal       Physical Exam     Initial Vitals [02/21/24 1729]   BP Pulse Resp Temp SpO2   (!) 157/75 101 18 98.8 °F (37.1 °C) 97 %      MAP       --         Physical Exam    Nursing note and vitals reviewed.  Constitutional: He appears well-developed.   Patient is awake with his eyes open  Smiling  Not answering questions  No external signs of trauma   HENT:   Head: Normocephalic and atraumatic.   Eyes: EOM are normal. Pupils are equal, round, and reactive to light.    Cardiovascular:  Normal rate and regular rhythm.           No murmur heard.  Pulmonary/Chest: Breath sounds normal. No respiratory distress. He has no wheezes. He has no rales.   Abdominal: Abdomen is soft. He exhibits no distension. There is no abdominal tenderness.   Musculoskeletal:      Comments: Purposeful movements   Appears to be moving all extremities      Skin: Skin is warm. Capillary refill takes less than 2 seconds. No rash noted.         ED Course   Procedures  Labs Reviewed   COMPREHENSIVE METABOLIC PANEL - Abnormal; Notable for the following components:       Result Value    Sodium Level 135 (*)     Glucose Level 145 (*)     Creatinine 0.67 (*)     Albumin Level 3.0 (*)     Globulin 3.8 (*)     Albumin/Globulin Ratio 0.8 (*)     Alkaline Phosphatase 155 (*)     All other components within normal limits   PHENYTOIN LEVEL, TOTAL - Abnormal; Notable for the following components:    Phenytoin Level Total 6.1 (*)     All other components within normal limits   CBC WITH DIFFERENTIAL - Abnormal; Notable for the following components:    WBC 11.76 (*)     RBC 3.80 (*)     Hgb 12.2 (*)     Hct 34.7 (*)     MCH 32.1 (*)     Neut # 9.21 (*)     IG# 0.11 (*)     All other components within normal limits   TROPONIN I - Normal   PROTIME-INR - Normal   MAGNESIUM - Normal   LACTIC ACID, PLASMA - Normal   URINALYSIS, REFLEX TO URINE CULTURE - Normal   CBC W/ AUTO DIFFERENTIAL    Narrative:     The following orders were created for panel order CBC auto differential.  Procedure                               Abnormality         Status                     ---------                               -----------         ------                     CBC with Differential[[id number]]       Abnormal            Final result                 Please view results for these tests on the individual orders.          Imaging Results              CT Cervical Spine Without Contrast (Final result)  Result time 02/21/24 18:27:57      Final  result by Pravin Card MD (02/21/24 18:27:57)                   Impression:      Some degenerative changes in the cervical spine    Old fracture of the spinous process of C7      Electronically signed by: Jose Card  Date:    02/21/2024  Time:    18:27               Narrative:    EXAMINATION:  CT CERVICAL SPINE WITHOUT CONTRAST    CLINICAL HISTORY:  fall;    TECHNIQUE:  Low dose axial images, sagittal and coronal reformations were performed though the cervical spine.  Contrast was not administered. Automatic exposure control is utilized to reduce patient radiation exposure.    COMPARISON:  None    FINDINGS:  The vertebral body heights are well maintained.  The bones are osteoporotic.  Mild degenerative changes are seen in the mid cervical spine.  There appears to be an old fracture at the level of the C7 spinous process.  At sclerotic borders.  No new fracture is seen.  No dislocation is seen.  Odontoid lateral masses appear grossly unremarkable.  No soft tissue abnormality is seen.                                       CT Head Without Contrast (Final result)  Result time 02/21/24 18:21:58      Final result by Alec Mckeon MD (02/21/24 18:21:58)                   Impression:      No acute intracranial findings or significant interval change compared to March 2023.      Electronically signed by: Alec Mckeon  Date:    02/21/2024  Time:    18:21               Narrative:    EXAMINATION:  CT HEAD WITHOUT CONTRAST    CLINICAL HISTORY:  Mental status change, unknown cause;    TECHNIQUE:  CT imaging of the head performed from the skull base to the vertex without intravenous contrast. DLP 1520 mGycm. Automatic exposure control, adjustment of mA/kV or iterative reconstruction technique was used to reduce radiation.    COMPARISON:  17 March 2023    FINDINGS:  There is no acute cortical infarct, hemorrhage or mass lesion.  No new parenchymal attenuation abnormality.  Ventricular size is stable.  There are  vascular calcifications.    Visualized paranasal sinuses and mastoid air cells are clear.  Calvarium is grossly intact.  There is debris in the external auditory canals.                                       X-Ray Chest AP Portable (Final result)  Result time 02/21/24 17:58:21      Final result by Alec Mckeon MD (02/21/24 17:58:21)                   Impression:      No acute findings.  Slightly improved aeration of the lungs today      Electronically signed by: Alec Mckeon  Date:    02/21/2024  Time:    17:58               Narrative:    EXAMINATION:  XR CHEST AP PORTABLE    CLINICAL HISTORY:  Unspecified convulsions    COMPARISON:  19 February 2024    FINDINGS:  Frontal view of the chest was obtained. Heart and mediastinum unchanged.  Lungs are slightly better aerated today.  No new focal consolidation or pneumothorax.                                       Medications   FOSphenytoin (CEREBYX) 1,000 mg PE in dextrose 5 % (D5W) 100 mL IVPB (0 mg PE Intravenous Stopped 2/21/24 1834)   0.9%  NaCl infusion ( Intravenous Stopped 2/21/24 2124)     Medical Decision Making  Amount and/or Complexity of Data Reviewed  Labs: ordered.  Radiology: ordered. Decision-making details documented in ED Course.    Risk  Prescription drug management.    Differential diagnosis (includes but is not limited to):   Breakthrough seizure, medication noncompliance, ICH, CVA, TIA, traumatic injury, dehydration, kidney injury, electrolyte abnormality    MDM Narrative  63-year-old male with a history of mental disability and seizures prescribed Dilantin presents for a seizure x3 earlier today.  EMS reports patient may not be taking his medications as prescribed.  EMS reports patient was currently at his baseline mental status.  Family is EN route, hopefully they will shed some light on the patient's mental status at his baseline.  Regardless, laboratory evaluation is pending.  IV fluid rehydration ordered.  Cerebyx IV loading dose ordered.   Dilantin level is pending.  CTs pending to evaluate for acute traumatic injury.  Chest x-ray reviewed.    Update:  Labs reviewed.  Imaging reviewed.  Dilantin level is low.  Discussed hospital admission with the patient's son who is power-of- at bedside, however patient's son wishes for the patient to be discharged back to his nursing facility as he was at his baseline mental status.  I have stressed the importance of the patient taking his seizure medications as prescribed prevent further breakthrough seizures, patient's son has verbalized understanding.  I will refill the patient's antiepileptic prescriptions to ensure the nursing home has adequate supply.  Strict return precautions discussed and the patient's son has verbalized understanding    Dispo: Discharge    My independent radiology interpretation: as above  Point of care US (independently performed and interpreted):   Decision rules/clinical scoring:     Sepsis Perfusion Assessment:     Amount and/or Complexity of Data Reviewed  Independent historian: EMS   Summary of history: as above  External data reviewed: notes from previous admissions, notes from previous ED visits, and notes from clinic visits  Summary of data reviewed: Prior records reviewed  Risk and benefits of testing: discussed   Labs: ordered and reviewed  Radiology: ordered and independent interpretation performed (see above or ED course)  ECG/medicine tests: ordered and independent interpretation performed (see above or ED course)  Discussion of management or test interpretation with external provider(s): none   Summary of discussion:     Risk  OTC medications  Prescription drug management   Parenteral controlled substances   Decision regarding hospitalization  Shared decision making     Critical Care  none    Data Reviewed/Counseling: I have personally reviewed the patient's vital signs, nursing notes, and other relevant tests, information, and imaging. I had a detailed discussion  regarding the historical points, exam findings, and any diagnostic results supporting the discharge diagnosis. I personally performed the history, PE, MDM and procedures as documented above and agree with the scribe's documentation.    Portions of this note were dictated using voice recognition software. Although it was reviewed for accuracy, some inherent voice recognition errors may have occurred and may be present in this document.          Scribe Attestation:   Scribe #1: I performed the above scribed service and the documentation accurately describes the services I performed. I attest to the accuracy of the note.    Attending Attestation:           Physician Attestation for Scribe:  Physician Attestation Statement for Scribe #1: I, Arnulfo Cabrera MD, reviewed documentation, as scribed by Ping López in my presence, and it is both accurate and complete.             ED Course as of 03/15/24 0805   Wed Feb 21, 2024   1834 X-Ray Chest AP Portable  Independently visualized/reviewed by me during the ED visit.  - No pneumothorax, improved from previous [MC]      ED Course User Index  [MC] Arnulfo Cabrera MD                           Clinical Impression:  Final diagnoses:  [R56.9] Seizure  [G40.919] Breakthrough seizure (Primary)          ED Disposition Condition    Discharge Stable          ED Prescriptions       Medication Sig Dispense Start Date End Date Auth. Provider    DILANTIN EXTENDED 100 mg ER capsule Take 1 capsule (100 mg total) by mouth once daily. 30 capsule 2/21/2024 -- Arnulfo Cabrera MD    PHENobarbitaL (LUMINAL) 30 MG tablet Take 4 tablets (120 mg total) by mouth nightly. 120 tablet 2/21/2024 -- Arnulfo Cabrera MD          Follow-up Information       Follow up With Specialties Details Why Contact Info    Meeks, Claude H., MD Family Medicine Schedule an appointment as soon as possible for a visit   [address] [place] [address] [address] [id number]  [phone]      [facility]  General - Emergency Dept Emergency Medicine  If symptoms worsen [address] [facility] [id number]  [phone]             Arnulfo Cabrera MD  03/15/24 0841

## 2024-02-22 NOTE — DISCHARGE INSTRUCTIONS
